# Patient Record
Sex: FEMALE | Race: WHITE | Employment: UNEMPLOYED | URBAN - METROPOLITAN AREA
[De-identification: names, ages, dates, MRNs, and addresses within clinical notes are randomized per-mention and may not be internally consistent; named-entity substitution may affect disease eponyms.]

---

## 2017-03-28 ENCOUNTER — DOCUMENTATION ONLY (OUTPATIENT)
Dept: FAMILY MEDICINE CLINIC | Age: 62
End: 2017-03-28

## 2017-03-28 NOTE — PROGRESS NOTES
Rec'd medical records request from [address] [place] [address], faxed request to [employer] for processing on 03/28/17, confirmation rec'd.

## 2017-06-08 ENCOUNTER — OFFICE VISIT (OUTPATIENT)
Dept: FAMILY MEDICINE CLINIC | Age: 62
End: 2017-06-08

## 2017-06-08 VITALS
TEMPERATURE: 98.1 F | SYSTOLIC BLOOD PRESSURE: 144 MMHG | HEIGHT: 60 IN | OXYGEN SATURATION: 98 % | DIASTOLIC BLOOD PRESSURE: 88 MMHG | RESPIRATION RATE: 12 BRPM | WEIGHT: 246.6 LBS | HEART RATE: 96 BPM | BODY MASS INDEX: 48.42 KG/M2
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
## 2017-06-08 DIAGNOSIS — E78.00 HYPERCHOLESTEREMIA: ICD-10-CM

## 2017-06-08 DIAGNOSIS — G89.29 CHRONIC PAIN OF BOTH KNEES: ICD-10-CM

## 2017-06-08 DIAGNOSIS — M25.562 CHRONIC PAIN OF BOTH KNEES: ICD-10-CM

## 2017-06-08 DIAGNOSIS — E03.9 HYPOTHYROIDISM, UNSPECIFIED TYPE: Primary | ICD-10-CM

## 2017-06-08 DIAGNOSIS — E66.09 NON MORBID OBESITY DUE TO EXCESS CALORIES: ICD-10-CM

## 2017-06-08 DIAGNOSIS — M25.561 CHRONIC PAIN OF BOTH KNEES: ICD-10-CM

## 2017-06-08 DIAGNOSIS — T14.8XXA BRUISE: ICD-10-CM

## 2017-06-08 RX ORDER — DOXYCYCLINE 100 MG/1
100 CAPSULE ORAL 2 TIMES DAILY
Qty: 20 CAP | Refills: 0 | Status: SHIPPED | OUTPATIENT
Start: 2017-06-08 | End: 2017-06-18

## 2017-06-08 RX ORDER — ASPIRIN 325 MG
325 TABLET ORAL
COMMUNITY
End: 2017-07-14 | Stop reason: ALTCHOICE

## 2017-06-08 RX ORDER — BISMUTH SUBSALICYLATE 262 MG
1 TABLET,CHEWABLE ORAL DAILY
COMMUNITY
End: 2019-01-15 | Stop reason: ALTCHOICE

## 2017-06-08 NOTE — PROGRESS NOTES
Darcy Naylor is a 63 y.o. female  presents for establish care. She has bruise on right lower leg and insect bite on left lower leg. She has some redness but no drainage. Allergies   Allergen Reactions    Bactrim [Sulfamethoprim Ds] Rash     Patient gets full bodied rash.  Ibuprofen Other (comments)     headaches    Pcn [Penicillins] Itching    Sulfa (Sulfonamide Antibiotics) Hives     Outpatient Prescriptions Marked as Taking for the 6/8/17 encounter (Office Visit) with Garrick Peralta MD   Medication Sig Dispense Refill    multivitamin (ONE A DAY) tablet Take 1 Tab by mouth daily.  aspirin (ASPIRIN) 325 mg tablet Take 325 mg by mouth every four (4) hours as needed for Pain.  levothyroxine (SYNTHROID) 125 mcg tablet Take 1 Tab by mouth Daily (before breakfast). **STOP 150 MCG DOSE** 30 Tab 6    furosemide (LASIX) 20 mg tablet TAKE ONE TABLET BY MOUTH ONCE DAILY 30 Tab 0    ASCORBIC ACID (ACEROLA C-500 PO) Take  by mouth.        Patient Active Problem List   Diagnosis Code    Hypothyroid E03.9    Tobacco abuse Z72.0    Hypercholesteremia E78.00    Chronic low back pain M54.5, G89.29     Past Medical History:   Diagnosis Date    Chronic low back pain 3/11/2013    Hypothyroid     Spondylisthesis     Thyroid disease     Tobacco abuse 11/3/2011     Social History     Social History    Marital status: SINGLE     Spouse name: N/A    Number of children: N/A    Years of education: N/A     Social History Main Topics    Smoking status: Current Every Day Smoker     Packs/day: 1.00     Years: 40.00    Smokeless tobacco: Never Used      Comment: tried snorting snuff to clear sinuses    Alcohol use No    Drug use: No    Sexual activity: Yes     Partners: Male     Other Topics Concern     Service No     parents and spouse were in the [address] [place] [address] Blood Transfusions No    Caffeine Concern No    Occupational Exposure Yes     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 Cardiovascular: Negative for chest pain and palpitations. Gastrointestinal: Negative for constipation, diarrhea, nausea and vomiting. Genitourinary: Negative. Neurological: Negative for headaches. Vitals:    06/08/17 1431   BP: 144/88   Pulse: 96   Resp: 12   Temp: 98.1 °F (36.7 °C)   TempSrc: Oral   SpO2: 98%   Weight: 246 lb 9.6 oz (111.9 kg)   Height: 5' (1.524 m)   PainSc:   5   PainLoc: Back       Physical Exam   Constitutional: She is oriented to person, place, and time and well-developed, well-nourished, and in no distress. Neck: Normal range of motion. Neck supple. Cardiovascular: Normal rate, regular rhythm and normal heart sounds. Pulmonary/Chest: Effort normal and breath sounds normal.   Neurological: She is alert and oriented to person, place, and time. Gait normal.   Skin: Skin is warm and dry. No rash noted. No erythema. No pallor. Bruise on right lower leg with mild erythema on left lower leg. Psychiatric: Mood, memory, affect and judgment normal.       Assessment/Plan      ICD-10-CM ICD-9-CM    1. Hypothyroidism, unspecified type E03.9 244.9    2. Hypercholesteremia E78.00 272.0    3. Non morbid obesity due to excess calories E66.09 278.00    4. Bruise T14.8 924.9    5. Insect bite, initial encounter W57. XXXA 919.4 doxycycline (VIBRAMYCIN) 100 mg capsule     E906.4    6.  Chronic pain of both knees M25.561 719.46 REFERRAL TO PHYSICAL THERAPY    M25.562 338.29     G89.29       I have discussed the diagnosis with the patient and the intended plan of care as seen in the above orders. The patient has received an after-visit summary and questions were answered concerning future plans. I have discussed medication, side effects, and warnings with the patient in detail. The patient verbalized understanding and is in agreement with the plan of care. The patient will contact the office with any additional concerns.       Follow-up Disposition:  Return if symptoms worsen or fail to improve.  lab results and schedule of future lab studies reviewed with patient    Bernardino Lubin MD

## 2017-06-08 NOTE — PROGRESS NOTES
New patient c/o right leg injury and pain that happened about 10-12 days ago she still has bruising and pain in that area she also has an insect bite on the back of her left knee she would like looked at and is asking for referral to PT for weight gain.

## 2017-06-08 NOTE — PATIENT INSTRUCTIONS
Bruises: Care Instructions  Your Care Instructions    Bruises occur when small blood vessels under the skin tear or rupture, most often from a twist, bump, or fall. Blood leaks into tissues under the skin and causes a black-and-blue spot that often turns colors, including purplish black, reddish blue, or yellowish green, as the bruise heals. Bruises hurt, but most are not serious and will go away on their own within 2 to 4 weeks. Sometimes, gravity causes them to spread down the body. A leg bruise usually will take longer to heal than a bruise on the face or arms. Follow-up care is a key part of your treatment and safety. Be sure to make and go to all appointments, and call your doctor if you are having problems. Its also a good idea to know your test results and keep a list of the medicines you take. How can you care for yourself at home? · Take pain medicines exactly as directed. ¨ If the doctor gave you a prescription medicine for pain, take it as prescribed. ¨ If you are not taking a prescription pain medicine, ask your doctor if you can take an over-the-counter medicine. · Put ice or a cold pack on the area for 10 to 20 minutes at a time. Put a thin cloth between the ice and your skin. · If you can, prop up the bruised area on pillows as much as possible for the next few days. Try to keep the bruise above the level of your heart. When should you call for help? Call your doctor now or seek immediate medical care if:  · You have signs of infection, such as:  ¨ Increased pain, swelling, warmth, or redness. ¨ Red streaks leading from the bruise. ¨ Pus draining from the bruise. ¨ A fever. · You have a bruise on your leg and signs of a blood clot, such as:  ¨ Increasing redness and swelling along with warmth, tenderness, and pain in the bruised area. ¨ Pain in your calf, back of the knee, thigh, or groin. ¨ Redness and swelling in your leg or groin. · Your pain gets worse.   Watch closely for changes in your health, and be sure to contact your doctor if:  · You do not get better as expected. Where can you learn more? Go to http://nathan-da.info/. Enter ([phone] in the search box to learn more about \"Bruises: Care Instructions. \"  Current as of: May 27, 2016  Content Version: 11.2  © 1026-1524 [employer]. Care instructions adapted under license by [employer] (which disclaims liability or warranty for this information). If you have questions about a medical condition or this instruction, always ask your healthcare professional. [place] [address] any warranty or liability for your use of this information.

## 2017-06-08 NOTE — MR AVS SNAPSHOT
Visit Information Date & Time Provider Department Dept. Phone Encounter #  
 6/8/2017  2:00 PM Elroy Mojica MD [facility] [phone] [id number] Follow-up Instructions Return if symptoms worsen or fail to improve. Upcoming Health Maintenance Date Due Hepatitis C Screening 1955 Pneumococcal 19-64 Medium Risk (1 of 1 - PPSV23) 11/9/1976 DTaP/Tdap/Td series (1 - Tdap) 11/9/1978 BREAST CANCER SCRN MAMMOGRAM 11/9/2007 FOBT Q 1 YEAR AGE 50-75 11/3/2012 PAP AKA CERVICAL CYTOLOGY 11/18/2014 INFLUENZA AGE 9 TO ADULT 8/1/2017 Allergies as of 6/8/2017  Review Complete On: 6/8/2017 By: Elroy Mojica MD  
  
 Severity Noted Reaction Type Reactions Bactrim [Sulfamethoprim Ds]  12/30/2013   Systemic Rash Patient gets full bodied rash. Ibuprofen  05/12/2010    Other (comments)  
 headaches Pcn [Penicillins]  05/12/2010    Itching Sulfa (Sulfonamide Antibiotics)  07/25/2015    Hives Current Immunizations  Reviewed on 10/3/2016 No immunizations on file. Not reviewed this visit You Were Diagnosed With   
  
 Codes Comments Hypothyroidism, unspecified type    -  Primary ICD-10-CM: E03.9 ICD-9-CM: 244.9 Hypercholesteremia     ICD-10-CM: E78.00 ICD-9-CM: 272.0 Non morbid obesity due to excess calories     ICD-10-CM: E66.09 
ICD-9-CM: 278.00 Bruise     ICD-10-CM: T14.8 ICD-9-CM: 924.9 Insect bite, initial encounter     ICD-10-CM: W57. Gonzalez [place] ICD-9-CM: 919.4, E906.4 Chronic pain of both knees     ICD-10-CM: M25.561, M25.562, G89.29 ICD-9-CM: 719.46, 338.29 Vitals BP Pulse Temp Resp Height(growth percentile) Weight(growth percentile) 144/88 (BP 1 Location: Left arm, BP Patient Position: Sitting) 96 98.1 °F (36.7 °C) (Oral) 12 5' (1.524 m) 246 lb 9.6 oz (111.9 kg) SpO2 BMI OB Status Smoking Status 98% 48.16 kg/m2 Postmenopausal Current Every Day Smoker Vitals History BMI and BSA Data Body Mass Index Body Surface Area  
 48.16 kg/m 2 2.18 m 2 Preferred Pharmacy Pharmacy Name Phone [facility] [address] [phone] Your Updated Medication List  
  
   
This list is accurate as of: 6/8/17  2:55 PM.  Always use your most recent med list.  
  
  
  
  
 ACEROLA C-500 PO Take  by mouth. aspirin 325 mg tablet Commonly known as:  ASPIRIN Take 325 mg by mouth every four (4) hours as needed for Pain. doxycycline 100 mg capsule Commonly known as:  VIBRAMYCIN Take 1 Cap by mouth two (2) times a day for 10 days. furosemide 20 mg tablet Commonly known as:  LASIX TAKE ONE TABLET BY MOUTH ONCE DAILY  
  
 levothyroxine 125 mcg tablet Commonly known as:  SYNTHROID Take 1 Tab by mouth Daily (before breakfast). **STOP 150 MCG DOSE**  
  
 metFORMIN  mg tablet Commonly known as:  GLUCOPHAGE XR Take 1 Tab by mouth two (2) times daily (with meals). multivitamin tablet Commonly known as:  ONE A DAY Take 1 Tab by mouth daily. OSTEO BI-FLEX PO Take  by mouth. traMADol 50 mg tablet Commonly known as:  ULTRAM  
Take 1 Tab by mouth every six (6) hours as needed for Pain. Max Daily Amount: 200 mg. Prescriptions Sent to Pharmacy Refills  
 doxycycline (VIBRAMYCIN) 100 mg capsule 0 Sig: Take 1 Cap by mouth two (2) times a day for 10 days. Class: Normal  
 Pharmacy: [address] #: [phone] Route: Oral  
  
We Performed the Following REFERRAL TO PHYSICAL THERAPY [DYC77 Custom] Comments:  
 Please evaluate patient for low back pain and bilateral knee pain Follow-up Instructions Return if symptoms worsen or fail to improve. Referral Information Referral ID Referred By Referred To  
  
 [id number] ROSHAN Meyers Not Available Visits Status Start Date End Date 1 New Request 6/8/17 6/8/18 If your referral has a status of pending review or denied, additional information will be sent to support the outcome of this decision. Patient Instructions Bruises: Care Instructions Your Care Instructions Bruises occur when small blood vessels under the skin tear or rupture, most often from a twist, bump, or fall. Blood leaks into tissues under the skin and causes a black-and-blue spot that often turns colors, including purplish black, reddish blue, or yellowish green, as the bruise heals. Bruises hurt, but most are not serious and will go away on their own within 2 to 4 weeks. Sometimes, gravity causes them to spread down the body. A leg bruise usually will take longer to heal than a bruise on the face or arms. Follow-up care is a key part of your treatment and safety. Be sure to make and go to all appointments, and call your doctor if you are having problems. Its also a good idea to know your test results and keep a list of the medicines you take. How can you care for yourself at home? · Take pain medicines exactly as directed. ¨ If the doctor gave you a prescription medicine for pain, take it as prescribed. ¨ If you are not taking a prescription pain medicine, ask your doctor if you can take an over-the-counter medicine. · Put ice or a cold pack on the area for 10 to 20 minutes at a time. Put a thin cloth between the ice and your skin. · If you can, prop up the bruised area on pillows as much as possible for the next few days. Try to keep the bruise above the level of your heart. When should you call for help? Call your doctor now or seek immediate medical care if: 
· You have signs of infection, such as: 
¨ Increased pain, swelling, warmth, or redness. ¨ Red streaks leading from the bruise. ¨ Pus draining from the bruise. ¨ A fever. · You have a bruise on your leg and signs of a blood clot, such as: ¨ Increasing redness and swelling along with warmth, tenderness, and pain in the bruised area. ¨ Pain in your calf, back of the knee, thigh, or groin. ¨ Redness and swelling in your leg or groin. · Your pain gets worse. Watch closely for changes in your health, and be sure to contact your doctor if: 
· You do not get better as expected. Where can you learn more? Go to http://nathan-da.info/. Enter ([phone] in the search box to learn more about \"Bruises: Care Instructions. \" Current as of: May 27, 2016 Content Version: 11.2 © 0234-3377 [employer]. Care instructions adapted under license by [employer] (which disclaims liability or warranty for this information). If you have questions about a medical condition or this instruction, always ask your healthcare professional. [address] any warranty or liability for your use of this information. Introducing [facility]! Dear Rebecca Serra: Thank you for requesting a HelloSign account. Our records indicate that you already have an active HelloSign account. You can access your account anytime at [email or web address] Did you know that you can access your hospital and ER discharge instructions at any time in HelloSign? You can also review all of your test results from your hospital stay or ER visit. Additional Information If you have questions, please visit the Frequently Asked Questions section of the HelloSign website at [email or web address] Remember, HelloSign is NOT to be used for urgent needs. For medical emergencies, dial 911. Now available from your iPhone and Android! Please provide this summary of care documentation to your next provider. Your primary care clinician is listed as [address]. If you have any questions after today's visit, please call [phone].

## 2017-06-14 ENCOUNTER — HOSPITAL ENCOUNTER (OUTPATIENT)
Dept: PHYSICAL THERAPY | Age: 62
Discharge: HOME | End: 2017-06-14
Payer: MEDICARE

## 2017-06-14 PROCEDURE — 97162 PT EVAL MOD COMPLEX 30 MIN: CPT

## 2017-06-14 PROCEDURE — G8979 MOBILITY GOAL STATUS: HCPCS

## 2017-06-14 PROCEDURE — G8978 MOBILITY CURRENT STATUS: HCPCS

## 2017-06-14 PROCEDURE — 97110 THERAPEUTIC EXERCISES: CPT

## 2017-06-14 NOTE — PROGRESS NOTES
PT DAILY TREATMENT NOTE     Patient Name: Pernell Canales  Date:2017  : 1955  [x]  Patient  Verified  Payor: BLUE CROSS MEDICARE / Plan: [facility] / Product Type: Managed Care Medicare /    In time:2:50  Out time:3:25  Total Treatment Time (min): 35  Total Timed Codes (min): 27  1:1 Treatment Time ( [address] only): 35   Visit #: 1 of 10    Treatment Area: Pain in right knee [M25.561]  Pain in left knee [M25.562]  Other chronic pain [G89.29]    SUBJECTIVE  Pain Level (0-10 scale): 3  Any medication changes, allergies to medications, adverse drug reactions, diagnosis change, or new procedure performed?: [x] No    [] Yes (see summary sheet for update)  Subjective functional status/changes:   [] No changes reported  See POC    OBJECTIVE    27 min [x]Eval                  []Re-Eval     8 min Therapeutic Exercise:  [] See flow sheet : HEP instruction and demonstration, pt education regarding anatomy and physiology of the LEs and spine and how it relates to the pt's condition. Rationale: increase ROM and increase strength to improve the patients ability to tolerate ADLs          With   [] TE   [] TA   [] neuro   [] other: Patient Education: [x] Review HEP    [] Progressed/Changed HEP based on:   [] positioning   [] body mechanics   [] transfers   [] heat/ice application    [] other:      Other Objective/Functional Measures: See evaluation. Pain Level (0-10 scale) post treatment: 0    ASSESSMENT/Changes in Function: Pt given HEP handout to perform. Pt understood exercises in HEP handout. Educated pt to go to the ER if she experiences bowel/bladder dysfunction. Pt demonstrated decreased AROM, decreased strength, tenderness to palpation. B patellar and achilles reflexes were hyper active. SLR was negative and pt denies having numbness and tingling symptoms.  Pt also reports noting that her right foot \"will drag\" when she walks at times, but right ankle DF was 4+/5 when assessed by the therapist. Leg length and pelvic alignment WNL. Flexion bias is noted. Pt would benefit from physical therapy to improve the above impairments to help the pt return to performing ADLs, functional and recreational activities. Patient will continue to benefit from skilled PT services to modify and progress therapeutic interventions, address functional mobility deficits, address ROM deficits, address strength deficits, analyze and address soft tissue restrictions, analyze and cue movement patterns, analyze and modify body mechanics/ergonomics, address imbalance/dizziness and instruct in home and community integration to attain remaining goals. [x]  See Plan of Care  []  See progress note/recertification  []  See Discharge Summary         Progress towards goals / Updated goals:  Short Term Goals: To be accomplished in 2 treatments:  1. Pt will report compliance and independence to HEP to help the pt manage their pain and symptoms. Eval: Established   Long Term Goals: To be accomplished in 10 treatments:  1. Pt will increase FOTO score to 55 points to improve ability to perform ADLs. Eval: 47  2. Pt will increase MMT right hip flex to 4-/5, right hip ABD to 4/5 to improve ability to tolerate functional tasks. Eval: right hip flex 3+/5, right hip ABD 4-/5  3. Pt will report a decrease in at worst LBP and B knee pain to 4/10 to improve pt's ability to perform household tasks. Eval: 6/10 at worst  4. Pt will report an increase in standing tolerance to 40 mins to improve tolerance to waiting in line at a store.   Eval: 30 min tolerance to standing    PLAN  [x]  Upgrade activities as tolerated     [x]  Continue plan of care  [x]  Update interventions per flow sheet       []  Discharge due to:_  []  Other:_      Carlos Alford, PT 6/14/2017  2:59 PM    Future Appointments  Date Time Provider Casper Stallings   6/14/2017 2:30 PM Carlos Alford, [place]   7/7/2017 8:00 AM Sunshine Nguyen MD [place]

## 2017-06-14 NOTE — PROGRESS NOTES
[facility] at [address] [facility]  [address]  Phone: [phone]      Fax:  [phone]    Plan of Care/ Statement of Necessity for Physical Therapy Services  Patient name: Carlin Wade Start of Care: 2017   Referral source: Alexey Tian MD : 1955    Medical Diagnosis: Pain in right knee [M25.561]  Pain in left knee [M25.562]  Other chronic pain [G89.29]   Onset Date:     Treatment Diagnosis: B knee pain and back pain   Prior Hospitalization: see medical history Provider#: [id number]   Medications: Verified on Patient summary List    Comorbidities: arthritis, latex allergy, thyroid problems, weight change of more than 10 lbs recently, latex allergy, tobacco use, spondylolisthesis L4-5, L5-S1. Prior Level of Function: Reports having LBP and B knee pain for years because of heavy lifting. The Plan of Care and following information is based on the information from the initial evaluation. Assessment/ key information:   Pt is a 63year old female who presents to therapy today with B knee pain and LBP. Pt states that she's had this pain \"for years due to heavy lifting\". Pt reports increased pain in her low back and weakness in her LEs (right>left). Pt reports having decreased standing tolerance secondary to her pain as well. Pt denies bowel/bladder dysfunction. Pt demonstrated decreased AROM, decreased strength, tenderness to palpation. B patellar and achilles reflexes were hyper active. SLR was negative and pt denies having numbness and tingling symptoms. Pt also reports noting that her right foot \"will drag\" when she walks at times, but right ankle DF was 4+/5 when assessed by the therapist. Leg length and pelvic alignment WNL. Flexion bias is noted. Pt would benefit from physical therapy to improve the above impairments to help the pt return to performing ADLs, functional and recreational activities.      Evaluation Complexity History HIGH Complexity :3+ comorbidities / personal factors will impact the outcome/ POC ; Examination MEDIUM Complexity : 3 Standardized tests and measures addressing body structure, function, activity limitation and / or participation in recreation  ;Presentation MEDIUM Complexity : Evolving with changing characteristics  ; Clinical Decision Making MEDIUM Complexity : FOTO score of 26-74  Overall Complexity Rating: MEDIUM  Problem List: pain affecting function, decrease ROM, decrease strength, impaired gait/ balance, decrease ADL/ functional abilitiies, decrease activity tolerance, decrease flexibility/ joint mobility and decrease transfer abilities   Treatment Plan may include any combination of the following: Therapeutic exercise, Therapeutic activities, Neuromuscular re-education, Physical agent/modality, Gait/balance training, Aquatic therapy, Patient education, Self Care training, Functional mobility training and Home safety training  Patient / Family readiness to learn indicated by: asking questions, trying to perform skills and interest  Persons(s) to be included in education: patient (P)  Barriers to Learning/Limitations: None  Patient Goal (s): pain management, core strength, weight management  Patient Self Reported Health Status: good  Rehabilitation Potential: fair     Short Term Goals: To be accomplished in 2 treatments:  1. Pt will report compliance and independence to [facility] to help the pt manage their pain and symptoms. Long Term Goals: To be accomplished in 10 treatments:  1. Pt will increase FOTO score to 55 points to improve ability to perform ADLs. 2. Pt will increase MMT right hip flex to 4-/5, right hip ABD to 4/5 to improve ability to tolerate functional tasks. 3. Pt will report a decrease in at worst LBP and B knee pain to 4/10 to improve pt's ability to perform household tasks.    4. Pt will report an increase in standing tolerance to 40 mins to improve tolerance to waiting in line at a store.    Frequency / Duration: Patient to be seen 2 times per week for 10 treatments. Patient/ Caregiver education and instruction: Diagnosis, prognosis, self care, activity modification and exercises   [x]  Plan of care has been reviewed with JSESICA    G-Codes (GP)  Mobility   Current  CK= 40-59%   Goal  CK= 40-59%    The severity rating is based on clinical judgment and the FOTO score. Certification Period: 6/14/2017-7/13/2017    Melissa Pérez, PT 6/14/2017 2:55 PM  _____________________________________________________________________  I certify that the above Therapy Services are being furnished while the patient is under my care. I agree with the treatment plan and certify that this therapy is necessary.     Physician's Signature:____________________  Date:__________Time:______    Please sign and return to [facility] at [address] [place] [address] [address]  Phone: [phone]      Fax:  [phone]

## 2017-06-22 ENCOUNTER — HOSPITAL ENCOUNTER (OUTPATIENT)
Dept: PHYSICAL THERAPY | Age: 62
End: 2017-06-22
Payer: MEDICARE

## 2017-07-13 ENCOUNTER — HOSPITAL ENCOUNTER (OUTPATIENT)
Dept: PHYSICAL THERAPY | Age: 62
Discharge: HOME | End: 2017-07-13
Payer: MEDICARE

## 2017-07-13 PROCEDURE — G8978 MOBILITY CURRENT STATUS: HCPCS

## 2017-07-13 PROCEDURE — 97110 THERAPEUTIC EXERCISES: CPT

## 2017-07-13 PROCEDURE — G8979 MOBILITY GOAL STATUS: HCPCS

## 2017-07-13 NOTE — PROGRESS NOTES
[facility] at [address] [place] [address] Avenue  Phone: [phone]      Fax:  [phone]    Continued Plan of Care/ Re-certification for Physical Therapy Services    Patient name: Indu Sam Start of Care: 2017   Referral source: Valentina Agrawal MD : 1955                         Medical Diagnosis: Pain in right knee [M25.561]  Pain in left knee [M25.562]  Other chronic pain [G89.29] Onset Date:                          Treatment Diagnosis: B knee pain and back pain   Prior Hospitalization: see medical history Provider#: [id number]   Medications: Verified on Patient summary List    Comorbidities: arthritis, latex allergy, thyroid problems, weight change of more than 10 lbs recently, latex allergy, tobacco use, spondylolisthesis L4-5, L5-S1. Prior Level of Function: Reports having LBP and B knee pain for years because of heavy lifting. Visits from Start of Care: 2   Missed Visits: 1    The Plan of Care and following information is based on the patient's current status:  Goal:Pt will report compliance and independence to [facility] to help the pt manage their pain and symptoms. Status at last note/certification: reports unable to perform because \"I have no room at home because I live with a hoarder\"   Current Status: not met    Goal:Pt will increase FOTO score to 55 points to improve ability to perform ADLs. Status at last note/certification: regression, currently 32 points, which could be correlated to pt not attending therapy sessions since the evaluation on 2017  Current Status: not met    Goal:Pt will increase MMT right hip flex to 4-/5, right hip ABD to 4/5 to improve ability to tolerate functional tasks.   Status at last note/certification: no change right hip flex 3+/5, right hip ABD 4-/5   Current Status: not met    Goal:Pt will report a decrease in at worst LBP and B knee pain to 4/10 to improve pt's ability to perform household tasks.   Status at last note/certification: no change 6/10 at worst   Current Status: not met    Goal:Pt will report an increase in standing tolerance to 40 mins to improve tolerance to waiting in line at a store. Status at last note/certification:no change 30 min tolerance   Current Status: not met    Key functional changes:   Functional Gains: mobility, overall pain, doing some walking in the pool. Functional Deficits: standing tolerance, states she has to remember to  her right foot when walking, avoids heavy lifting and drags items. Pain       Best: 0/10                 Worst: 6/10      Problems/ barriers to goal attainment: attendance to therapy sessions. Problem List: pain affecting function, decrease ROM, decrease strength, impaired gait/ balance, decrease ADL/ functional abilitiies, decrease activity tolerance, decrease flexibility/ joint mobility and decrease transfer abilities    Treatment Plan: Therapeutic exercise, Therapeutic activities, Neuromuscular re-education, Physical agent/modality, Gait/balance training, Manual therapy, Patient education, Self Care training, Functional mobility training and Home safety training     Patient Goal (s) has been updated and includes: \"be able to lift my foot and improve strength\"     Goals for this certification period to be accomplished in 4 weeks:  1. Pt will report compliance and independence to [facility] to help the pt manage their pain and symptoms. PN: reports unable to perform because \"I have no room at home because I live with a hoarder\"   2. Pt will increase FOTO score to 55 points to improve ability to perform ADLs. PN: regression, currently 32 points, which could be correlated to pt not attending therapy sessions since the evaluation on 6/14/2017   3. Pt will increase MMT right hip flex to 4-/5, right hip ABD to 4/5 to improve ability to tolerate functional tasks. PN: right hip flex 3+/5, right hip ABD 4-/5   4.  Pt will report a decrease in at worst LBP and B knee pain to 4/10 to improve pt's ability to perform household tasks. PN: 6/10 at worst   5. Pt will report an increase in standing tolerance to 40 mins to improve tolerance to waiting in line at a store. PN: 30 min tolerance    Frequency / Duration: Patient to be seen 2 times per week for 4 weeks:    G-Codes (GP)  Mobility  [id number] Current  CL= 60-79%   Goal  CK= 40-59%    The severity rating is based on clinical judgment and the FOTO score. Assessment / Recommendations:  Pt states that she \"has been very busy\" over the past several weeks which has made it difficult for her to attend therapy appointments. Pt was educated on importance of compliance with attending therapy sessions to improve symptoms and pt understood. Pt states that she has trouble performing the HEP at home because she lives \"with a hoarder and I don't have a bed to do the exercises on\". Discussed lifestyle changes with the pt and to talk to her house mate regarding having space in their home for HEP and to improve sleeping (pt reports she sleeps on the floor since she does not have a mattress because of the hoarding. Pt states she sometimes has increased pain because of sleeping on the floor). Pt states she will try to talk to her house mate regarding these issues. We will plan on continuing therapy at this time to improve the pt's strength and mobility to help the pt's ability to perform household tasks and ADLs with more ease. Certification Period: 7/13/2017 - 8/11/2017    Abhijit Agudelo, PT 7/13/2017 8:48 AM    ________________________________________________________________________  I certify that the above Therapy Services are being furnished while the patient is under my care. I agree with the treatment plan and certify that this therapy is necessary. [] I have read the above and request that my patient continue as recommended.   [] I have read the above report and request that my patient continue therapy with the following changes/special instructions: ______________________________________  [] I have read the above report and request that my patient be discharged from therapy    Physician's Signature:_______________________________Date:___________Time:__________  Please sign and return to [facility] at [address], [address]  [address] [facility] [address]  Phone: [phone]      Fax:  [phone]

## 2017-07-13 NOTE — PROGRESS NOTES
PT DAILY TREATMENT NOTE - [facility]     Patient Name: Indu Sam  Date:2017  : 1955  [x]  Patient  Verified  Payor: Williams Reyez / Plan: [place] PART [place] & B / Product Type: Medicare /    In time:8:43  Out time:9:30  Total Treatment Time (min): 47  Total Timed Codes (min): 47  1:1 Treatment Time ( [address] only): 34  Visit #: 2 of 10    Treatment Area: Pain in right knee [M25.561]  Pain in left knee [M25.562]  Other chronic pain [G89.29]    SUBJECTIVE  Pain Level (0-10 scale): 0  Any medication changes, allergies to medications, adverse drug reactions, diagnosis change, or new procedure performed?: [x] No    [] Yes (see summary sheet for update)  Subjective functional status/changes:   [] No changes reported  \"I have been going to the pool at home and walking in it. It helps with the mobility but I still have strength issues. I have to think about picking my right foot up when I walk\". Today is the pt's first follow up visit since the evaluation on 2017 secondary to pt \"being very busy\" and unable to attend therapy appointments. OBJECTIVE    43 min Therapeutic Exercise:  [x] See flow sheet : exercises and goal reassessment   Rationale: increase ROM and increase strength to improve the patients ability to tolerate ADLs    4 min Neuromuscular Re-education:  [x]  See flow sheet : SB exercises   Rationale: increase strength  to improve the patients ability to tolerate functional tasks          With   [] TE   [] TA   [] neuro   [] other: Patient Education: [x] Review HEP    [] Progressed/Changed HEP based on:   [] positioning   [] body mechanics   [] transfers   [] heat/ice application    [] other:      Other Objective/Functional Measures: See goals below. Functional Gains: mobility, overall pain, doing some walking in the pool. Functional Deficits: standing tolerance, states she has to remember to  her right foot when walking, avoids heavy lifting and drags items.     Pain       Best: 0/10     Worst: 6/10    Pain Level (0-10 scale) post treatment: 4 \"soreness\"    ASSESSMENT/Changes in Function: See re-certification. Pt states that she \"has been very busy\" over the past several weeks which has made it difficult for her to attend therapy appointments. Pt was educated on importance of compliance with attending therapy sessions to improve symptoms and pt understood. Pt states that she has trouble performing the HEP at home because she lives \"with a hoarder and I don't have a bed to do the exercises on\". Discussed lifestyle changes with the pt and to talk to her house mate regarding having space in their home for HEP and to improve sleeping (pt reports she sleeps on the floor since she does not have a mattress because of the hoarding. Pt states she sometimes has increased pain because of sleeping on the floor). Pt states she will try to talk to her house mate regarding these issues. We will plan on continuing therapy at this time to improve the pt's strength and mobility to help the pt's ability to perform household tasks and ADLs with more ease. Patient will continue to benefit from skilled PT services to modify and progress therapeutic interventions, address functional mobility deficits, address ROM deficits, address strength deficits, analyze and address soft tissue restrictions, analyze and cue movement patterns, analyze and modify body mechanics/ergonomics, address imbalance/dizziness and instruct in home and community integration to attain remaining goals. []  See Plan of Care  [x]  See progress note/recertification  []  See Discharge Summary         Progress towards goals / Updated goals:  Short Term Goals: To be accomplished in 2 treatments:  1. Pt will report compliance and independence to HEP to help the pt manage their pain and symptoms.    Eval: Established   Current: reports unable to perform because \"I have no room at home because I live with a hoarder\" 7/13/2017  Long Term Goals: To be accomplished in 10 treatments:  1. Pt will increase FOTO score to 55 points to improve ability to perform ADLs. Eval: 47   Current: regression, currently 32 points, which could be correlated to pt not attending therapy sessions since the evaluation on 6/14/2017   2. Pt will increase MMT right hip flex to 4-/5, right hip ABD to 4/5 to improve ability to tolerate functional tasks. Eval: right hip flex 3+/5, right hip ABD 4-/5  Current: right hip flex 3+/5, right hip ABD 4-/5 7/13/2017  3. Pt will report a decrease in at worst LBP and B knee pain to 4/10 to improve pt's ability to perform household tasks. Eval: 6/10 at worst  Current: no change 6/10 at worst 7/13/2017  4. Pt will report an increase in standing tolerance to 40 mins to improve tolerance to waiting in line at a store.   Eval: 30 min tolerance to standing  Current: no change 30 min tolerance 7/13/2017    PLAN  [x]  Upgrade activities as tolerated     [x]  Continue plan of care  [x]  Update interventions per flow sheet       []  Discharge due to:_  []  Other:_      Abhijit Agudelo PT 7/13/2017  8:50 AM    Future Appointments  Date Time Provider Casper Stallings   7/13/2017 8:30 AM Abhijit Agudelo PT [facility]

## 2017-07-14 ENCOUNTER — OFFICE VISIT (OUTPATIENT)
Dept: FAMILY MEDICINE CLINIC | Age: 62
End: 2017-07-14

## 2017-07-14 VITALS
HEART RATE: 88 BPM | OXYGEN SATURATION: 98 % | RESPIRATION RATE: 12 BRPM | TEMPERATURE: 98.4 F | HEIGHT: 60 IN | BODY MASS INDEX: 49.2 KG/M2 | SYSTOLIC BLOOD PRESSURE: 134 MMHG | DIASTOLIC BLOOD PRESSURE: 76 MMHG | WEIGHT: 250.6 LBS

## 2017-07-14 DIAGNOSIS — Z12.11 SCREEN FOR COLON CANCER: ICD-10-CM

## 2017-07-14 DIAGNOSIS — Z71.89 ACP (ADVANCE CARE PLANNING): ICD-10-CM

## 2017-07-14 DIAGNOSIS — Z12.31 ENCOUNTER FOR SCREENING MAMMOGRAM FOR MALIGNANT NEOPLASM OF BREAST: ICD-10-CM

## 2017-07-14 DIAGNOSIS — E03.9 HYPOTHYROIDISM, UNSPECIFIED TYPE: ICD-10-CM

## 2017-07-14 DIAGNOSIS — Z00.00 ROUTINE GENERAL MEDICAL EXAMINATION AT A HEALTH CARE FACILITY: Primary | ICD-10-CM

## 2017-07-14 DIAGNOSIS — Z13.39 SCREENING FOR ALCOHOLISM: ICD-10-CM

## 2017-07-14 DIAGNOSIS — Z11.59 NEED FOR HEPATITIS C SCREENING TEST: ICD-10-CM

## 2017-07-14 NOTE — ACP (ADVANCE CARE PLANNING)
Advance Care Planning (ACP) Provider Conversation Snapshot    Date of ACP Conversation: 07/14/17  Persons included in Conversation:  patient  Length of ACP Conversation in minutes:  16 minutes    Authorized Decision Maker (if patient is incapable of making informed decisions):    This person is:   Healthcare Agent/Medical Power of  under Advance Directive          For Patients with Decision Making Capacity:   Values/Goals: Exploration of values, goals, and preferences if recovery is not expected, even with continued medical treatment in the event of:  does not want one    Conversation Outcomes / Follow-Up Plan:   Recommended completion of Advance Directive form after review of ACP materials and conversation with prospective healthcare agent

## 2017-07-14 NOTE — MR AVS SNAPSHOT
Visit Information Date & Time Provider Department Dept. Phone Encounter #  
 7/14/2017  8:15 AM Tom Harvey, [address] [id number] Follow-up Instructions Return in about 3 months (around 10/14/2017). Upcoming Health Maintenance Date Due Hepatitis C Screening 1955 DTaP/Tdap/Td series (1 - Tdap) 11/9/1978 BREAST CANCER SCRN MAMMOGRAM 11/9/2007 FOBT Q 1 YEAR AGE 50-75 11/3/2012 PAP AKA CERVICAL CYTOLOGY 11/18/2014 INFLUENZA AGE 9 TO ADULT 8/1/2017 Allergies as of 7/14/2017  Review Complete On: 7/14/2017 By: Tom Harvey MD  
  
 Severity Noted Reaction Type Reactions Bactrim [Sulfamethoprim Ds]  12/30/2013   Systemic Rash Patient gets full bodied rash. Ibuprofen  05/12/2010    Other (comments)  
 headaches Pcn [Penicillins]  05/12/2010    Itching Sulfa (Sulfonamide Antibiotics)  07/25/2015    Hives Current Immunizations  Reviewed on 10/3/2016 No immunizations on file. Not reviewed this visit You Were Diagnosed With   
  
 Codes Comments Routine general medical examination at a health care facility    -  Primary ICD-10-CM: Z00.00 ICD-9-CM: V70.0 Screening for alcoholism     ICD-10-CM: Z13.89 ICD-9-CM: V79.1 Encounter for screening mammogram for malignant neoplasm of breast     ICD-10-CM: Z12.31 
ICD-9-CM: V76.12 Screen for colon cancer     ICD-10-CM: Z12.11 ICD-9-CM: V76.51 Need for hepatitis C screening test     ICD-10-CM: Z11.59 
ICD-9-CM: V73.89   
 ACP (advance care planning)     ICD-10-CM: Z71.89 ICD-9-CM: V65.49 Hypothyroidism, unspecified type     ICD-10-CM: E03.9 ICD-9-CM: 240. 9 Vitals BP Pulse Temp Resp Height(growth percentile) Weight(growth percentile) 134/76 (BP 1 Location: Left arm, BP Patient Position: Sitting) 88 98.4 °F (36.9 °C) (Oral) 12 5' (1.524 m) 250 lb 9.6 oz (113.7 kg) SpO2 BMI OB Status Smoking Status 98% 48.94 kg/m2 Postmenopausal Current Every Day Smoker Vitals History BMI and BSA Data Body Mass Index Body Surface Area 48.94 kg/m 2 2.19 m 2 Preferred Pharmacy Pharmacy Name Phone [facility] [place] [address] [phone] Your Updated Medication List  
  
   
This list is accurate as of: 7/14/17  9:03 AM.  Always use your most recent med list.  
  
  
  
  
 ACEROLA C-500 PO Take  by mouth. furosemide 20 mg tablet Commonly known as:  LASIX TAKE ONE TABLET BY MOUTH ONCE DAILY  
  
 levothyroxine 125 mcg tablet Commonly known as:  SYNTHROID Take 1 Tab by mouth Daily (before breakfast). **STOP 150 MCG DOSE**  
  
 multivitamin tablet Commonly known as:  ONE A DAY Take 1 Tab by mouth daily. Follow-up Instructions Return in about 3 months (around 10/14/2017). To-Do List   
 07/14/2017 Lab:  HEMOGLOBIN A1C WITH EAG   
  
 07/14/2017 Lab:  HEPATITIS C AB   
  
 07/14/2017 Lab:  LIPID PANEL   
  
 07/14/2017 Lab:  METABOLIC PANEL, COMPREHENSIVE   
  
 07/14/2017 Lab:  OCCULT BLOOD, IMMUNOASSAY (FIT)   
  
 07/14/2017 Lab:  TSH 3RD GENERATION   
  
 07/17/2017 Imaging:  CAROLINE MAMMO BI SCREENING INCL CAD   
  
 07/19/2017 10:30 AM  
  Appointment with Tanisha Lim PTA at [address] ([phone]) Patient Instructions Medicare Wellness Visit, Female The best way to live healthy is to have a healthy lifestyle by eating a well-balanced diet, exercising regularly, limiting alcohol and stopping smoking. Regular physical exams and screening tests are another way to keep healthy. Preventive exams provided by your health care provider can find health problems before they become diseases or illnesses. Preventive services including immunizations, screening tests, monitoring and exams can help you take care of your own health. All people over age 72 should have a pneumovax  and and a prevnar shot to prevent pneumonia. These are once in a lifetime unless you and your provider decide differently. All people over 65 should have a yearly flu shot and a tetanus vaccine every 10 years. A bone mass density to screen for osteoporosis or thinning of the bones should be done every 2 years after 65. Screening for diabetes mellitus with a blood sugar test should be done every year. Glaucoma is a disease of the eye due to increased ocular pressure that can lead to blindness and it should be done every year by an eye professional. 
 
Cardiovascular screening tests that check for elevated lipids (fatty part of blood) which can lead to heart disease and strokes should be done every 5 years. Colorectal screening that evaluates for blood or polyps in your colon should be done yearly as a stool test or every five years as a flexible sigmoidoscope or every 10 years as a colonoscopy up to age 76. Breast cancer screening with a mammogram is recommended biennially  for women age 54-69. Screening for cervical cancer with a pap smear and pelvic exam is recommended for women after age 72 years every 2 years up to age 79 or when the provider and patient decide to stop. If there is a history of cervical abnormalities or other increased risk for cancer then the test is recommended yearly. Hepatitis C screening is also recommended for anyone born between 80 through [address]. A shingles vaccine is also recommended once in a lifetime after age 61. Your Medicare Wellness Exam is recommended annually. Here is a list of your current Health Maintenance items with a due date: 
Health Maintenance Due Topic Date Due  
 Hepatitis C Test  1955  
 DTaP/Tdap/Td  (1 - Tdap) 11/09/1978  Breast Cancer Screening  11/09/2007  Stool testing for trace blood  11/03/2012  Cervical Cancer Screening  11/18/2014 Well Visit, Women 48 to 72: Care Instructions Your Care Instructions Physical exams can help you stay healthy. Your doctor has checked your overall health and may have suggested ways to take good care of yourself. He or she also may have recommended tests. At home, you can help prevent illness with healthy eating, regular exercise, and other steps. Follow-up care is a key part of your treatment and safety. Be sure to make and go to all appointments, and call your doctor if you are having problems. It's also a good idea to know your test results and keep a list of the medicines you take. How can you care for yourself at home? · Reach and stay at a healthy weight. This will lower your risk for many problems, such as obesity, diabetes, heart disease, and high blood pressure. · Get at least 30 minutes of exercise on most days of the week. Walking is a good choice. You also may want to do other activities, such as running, swimming, cycling, or playing tennis or team sports. · Do not smoke. Smoking can make health problems worse. If you need help quitting, talk to your doctor about stop-smoking programs and medicines. These can increase your chances of quitting for good. · Protect your skin from too much sun. When you're outdoors from 10 a.m. to 4 p.m., stay in the shade or cover up with clothing and a hat with a wide brim. Wear sunglasses that block UV rays. Even when it's cloudy, put broad-spectrum sunscreen (SPF 30 or higher) on any exposed skin. · See a dentist one or two times a year for checkups and to have your teeth cleaned. · Wear a seat belt in the car. · Limit alcohol to 1 drink a day. Too much alcohol can cause health problems. Follow your doctor's advice about when to have certain tests. These tests can spot problems early. · Cholesterol.  Your doctor will tell you how often to have this done based on your age, family history, or other things that can increase your risk for heart attack and stroke. · Blood pressure. Have your blood pressure checked during a routine doctor visit. Your doctor will tell you how often to check your blood pressure based on your age, your blood pressure results, and other factors. · Mammogram. Ask your doctor how often you should have a mammogram, which is an X-ray of your breasts. A mammogram can spot breast cancer before it can be felt and when it is easiest to treat. · Pap test and pelvic exam. Ask your doctor how often you should have a Pap test. You may not need to have a Pap test as often as you used to. · Vision. Have your eyes checked every year or two or as often as your doctor suggests. Some experts recommend that you have yearly exams for glaucoma and other age-related eye problems starting at age 48. · Hearing. Tell your doctor if you notice any change in your hearing. You can have tests to find out how well you hear. · Diabetes. Ask your doctor whether you should have tests for diabetes. · Colon cancer. You should begin tests for colon cancer at age 48. You may have one of several tests. Your doctor will tell you how often to have tests based on your age and risk. Risks include whether you already had a precancerous polyp removed from your colon or whether your parents, sisters and brothers, or children have had colon cancer. · Thyroid disease. Talk to your doctor about whether to have your thyroid checked as part of a regular physical exam. Women have an increased chance of a thyroid problem. · Osteoporosis. You should begin tests for bone density at age 72. If you are younger than 72, ask your doctor whether you have factors that may increase your risk for this disease. You may want to have this test before age 72. · Heart attack and stroke risk. At least every 4 to 6 years, you should have your risk for heart attack and stroke assessed.  Your doctor uses factors such as your age, blood pressure, cholesterol, and whether you smoke or have diabetes to show what your risk for a heart attack or stroke is over the next 10 years. When should you call for help? Watch closely for changes in your health, and be sure to contact your doctor if you have any problems or symptoms that concern you. Where can you learn more? Go to http://nathan-da.info/. Enter Q068 in the search box to learn more about \"Well Visit, Women 50 to 72: Care Instructions. \" Current as of: July 19, 2016 Content Version: 11.3 © 4455-8600 [employer]. Care instructions adapted under license by [facility] (which disclaims liability or warranty for this information). If you have questions about a medical condition or this instruction, always ask your healthcare professional. [address] any warranty or liability for your use of this information. Introducing [facility]! Dear Tiffanie Mccormick: Thank you for requesting a anydooR account. Our records indicate that you already have an active anydooR account. You can access your account anytime at [email or web address] Founders Did you know that you can access your hospital and ER discharge instructions at any time in anydooR? You can also review all of your test results from your hospital stay or ER visit. Additional Information If you have questions, please visit the Frequently Asked Questions section of the anydooR website at [email or web address]/. Remember, anydooR is NOT to be used for urgent needs. For medical emergencies, dial 911. Now available from your iPhone and Android! Please provide this summary of care documentation to your next provider. Your primary care clinician is listed as [address]. If you have any questions after today's visit, please call [phone].

## 2017-07-14 NOTE — PROGRESS NOTES
This is an Initial Medicare Annual Wellness Exam (AWV) (Performed 12 months after IPPE or effective date of Medicare Part B enrollment, Once in a lifetime)    I have reviewed the patient's medical history in detail and updated the computerized patient record. History     Past Medical History:   Diagnosis Date    Chronic low back pain 3/11/2013    Hypothyroid     Spondylisthesis     Thyroid disease     Tobacco abuse 11/3/2011      Past Surgical History:   Procedure Laterality Date    DELIVERY       HX APPENDECTOMY      HX  SECTION       &      Current Outpatient Prescriptions   Medication Sig Dispense Refill    multivitamin (ONE A DAY) tablet Take 1 Tab by mouth daily.  levothyroxine (SYNTHROID) 125 mcg tablet Take 1 Tab by mouth Daily (before breakfast). **STOP 150 MCG DOSE** 30 Tab 6    furosemide (LASIX) 20 mg tablet TAKE ONE TABLET BY MOUTH ONCE DAILY 30 Tab 0    ASCORBIC ACID (ACEROLA C-500 PO) Take  by mouth. Allergies   Allergen Reactions    Bactrim [Sulfamethoprim Ds] Rash     Patient gets full bodied rash.     Ibuprofen Other (comments)     headaches    Pcn [Penicillins] Itching    Sulfa (Sulfonamide Antibiotics) Hives     Family History   Problem Relation Age of Onset    Diabetes Maternal Grandmother      Social History   Substance Use Topics    Smoking status: Current Every Day Smoker     Packs/day: 1.00     Years: 40.00    Smokeless tobacco: Never Used      Comment: tried snorting snuff to clear sinuses    Alcohol use No     Patient Active Problem List   Diagnosis Code    Hypothyroid E03.9    Tobacco abuse Z72.0    Hypercholesteremia E78.00    Chronic low back pain M54.5, G89.29    Non morbid obesity due to excess calories E66.09         Depression Risk Factor Screening:     PHQ over the last two weeks 2017   PHQ Not Done -   Little interest or pleasure in doing things Not at all   Feeling down, depressed or hopeless Not at all   Total Score PHQ 2 0     Alcohol Risk Factor Screening: On any occasion during the past 3 months, have you had more than 3 drinks containing alcohol? No    Do you average more than 7 drinks per week? No      Functional Ability and Level of Safety:     Hearing Loss   Whisper test passed. Activities of Daily Living   Self-care. Requires assistance with: no ADLs    Fall Risk   No flowsheet data found. Abuse Screen   Patient is not abused    Review of Systems   Not required    Physical Examination     No exam data present    Evaluation of Cognitive Function:  Mood/affect:  happy  Appearance: age appropriate and within normal Limits  Family member/caregiver input: N/A    No exam performed today, No exam warranted. Patient Care Team:  Jacky Romero MD as PCP - [facility])    Advice/Referrals/Counseling   Education and counseling provided:  Patient has declined information on ACP. Assessment/Plan   current treatment plan is effective, no change in therapy. Copies of 5 year plan given to patient.     Jacky Romero MD

## 2017-07-14 NOTE — PATIENT INSTRUCTIONS
Medicare Wellness Visit, Female    The best way to live healthy is to have a healthy lifestyle by eating a well-balanced diet, exercising regularly, limiting alcohol and stopping smoking. Regular physical exams and screening tests are another way to keep healthy. Preventive exams provided by your health care provider can find health problems before they become diseases or illnesses. Preventive services including immunizations, screening tests, monitoring and exams can help you take care of your own health. All people over age 72 should have a pneumovax  and and a prevnar shot to prevent pneumonia. These are once in a lifetime unless you and your provider decide differently. All people over 65 should have a yearly flu shot and a tetanus vaccine every 10 years. A bone mass density to screen for osteoporosis or thinning of the bones should be done every 2 years after 65. Screening for diabetes mellitus with a blood sugar test should be done every year. Glaucoma is a disease of the eye due to increased ocular pressure that can lead to blindness and it should be done every year by an eye professional.    Cardiovascular screening tests that check for elevated lipids (fatty part of blood) which can lead to heart disease and strokes should be done every 5 years. Colorectal screening that evaluates for blood or polyps in your colon should be done yearly as a stool test or every five years as a flexible sigmoidoscope or every 10 years as a colonoscopy up to age 76. Breast cancer screening with a mammogram is recommended biennially  for women age 54-69. Screening for cervical cancer with a pap smear and pelvic exam is recommended for women after age 72 years every 2 years up to age 79 or when the provider and patient decide to stop. If there is a history of cervical abnormalities or other increased risk for cancer then the test is recommended yearly.     Hepatitis C screening is also recommended for anyone born between [birth year] through [facility]. A shingles vaccine is also recommended once in a lifetime after age 61. Your Medicare Wellness Exam is recommended annually. Here is a list of your current Health Maintenance items with a due date:  Health Maintenance Due   Topic Date Due    Hepatitis C Test  1955    DTaP/Tdap/Td  (1 - Tdap) 11/09/1978    Breast Cancer Screening  11/09/2007    Stool testing for trace blood  11/03/2012    Cervical Cancer Screening  11/18/2014          Well Visit, Women 48 to 72: Care Instructions  Your Care Instructions  Physical exams can help you stay healthy. Your doctor has checked your overall health and may have suggested ways to take good care of yourself. He or she also may have recommended tests. At home, you can help prevent illness with healthy eating, regular exercise, and other steps. Follow-up care is a key part of your treatment and safety. Be sure to make and go to all appointments, and call your doctor if you are having problems. It's also a good idea to know your test results and keep a list of the medicines you take. How can you care for yourself at home? · Reach and stay at a healthy weight. This will lower your risk for many problems, such as obesity, diabetes, heart disease, and high blood pressure. · Get at least 30 minutes of exercise on most days of the week. Walking is a good choice. You also may want to do other activities, such as running, swimming, cycling, or playing tennis or team sports. · Do not smoke. Smoking can make health problems worse. If you need help quitting, talk to your doctor about stop-smoking programs and medicines. These can increase your chances of quitting for good. · Protect your skin from too much sun. When you're outdoors from 10 a.m. to 4 p.m., stay in the shade or cover up with clothing and a hat with a wide brim. Wear sunglasses that block UV rays.  Even when it's cloudy, put broad-spectrum sunscreen (SPF 30 or higher) on any exposed skin. · See a dentist one or two times a year for checkups and to have your teeth cleaned. · Wear a seat belt in the car. · Limit alcohol to 1 drink a day. Too much alcohol can cause health problems. Follow your doctor's advice about when to have certain tests. These tests can spot problems early. · Cholesterol. Your doctor will tell you how often to have this done based on your age, family history, or other things that can increase your risk for heart attack and stroke. · Blood pressure. Have your blood pressure checked during a routine doctor visit. Your doctor will tell you how often to check your blood pressure based on your age, your blood pressure results, and other factors. · Mammogram. Ask your doctor how often you should have a mammogram, which is an X-ray of your breasts. A mammogram can spot breast cancer before it can be felt and when it is easiest to treat. · Pap test and pelvic exam. Ask your doctor how often you should have a Pap test. You may not need to have a Pap test as often as you used to. · Vision. Have your eyes checked every year or two or as often as your doctor suggests. Some experts recommend that you have yearly exams for glaucoma and other age-related eye problems starting at age 48. · Hearing. Tell your doctor if you notice any change in your hearing. You can have tests to find out how well you hear. · Diabetes. Ask your doctor whether you should have tests for diabetes. · Colon cancer. You should begin tests for colon cancer at age 48. You may have one of several tests. Your doctor will tell you how often to have tests based on your age and risk. Risks include whether you already had a precancerous polyp removed from your colon or whether your parents, sisters and brothers, or children have had colon cancer. · Thyroid disease.  Talk to your doctor about whether to have your thyroid checked as part of a regular physical exam. Women have an increased chance of a thyroid problem. · Osteoporosis. You should begin tests for bone density at age 72. If you are younger than 72, ask your doctor whether you have factors that may increase your risk for this disease. You may want to have this test before age 72. · Heart attack and stroke risk. At least every 4 to 6 years, you should have your risk for heart attack and stroke assessed. Your doctor uses factors such as your age, blood pressure, cholesterol, and whether you smoke or have diabetes to show what your risk for a heart attack or stroke is over the next 10 years. When should you call for help? Watch closely for changes in your health, and be sure to contact your doctor if you have any problems or symptoms that concern you. Where can you learn more? Go to http://nathan-da.info/. Enter M051 in the search box to learn more about \"Well Visit, Women 50 to 72: Care Instructions. \"  Current as of: July 19, 2016  Content Version: 11.3  © 7942-6115 [employer]. Care instructions adapted under license by [employer] (which disclaims liability or warranty for this information). If you have questions about a medical condition or this instruction, always ask your healthcare professional. [place] [address] any warranty or liability for your use of this information.

## 2017-07-16 LAB
ALBUMIN SERPL-MCNC: 4.4 G/DL (ref 3.5–5.5)
ALBUMIN/GLOB SERPL: 1.6 {RATIO} (ref 1.2–2.2)
ALP SERPL-CCNC: 82 IU/L (ref 39–117)
ALT SERPL-CCNC: 18 IU/L (ref 0–32)
AST SERPL-CCNC: 19 IU/L (ref 0–40)
BILIRUB SERPL-MCNC: <0.2 MG/DL (ref 0–1.2)
BUN SERPL-MCNC: 14 MG/DL (ref 6–24)
BUN/CREAT SERPL: 15 (ref 9–23)
CALCIUM SERPL-MCNC: 9.1 MG/DL (ref 8.7–10.2)
CHLORIDE SERPL-SCNC: 100 MMOL/L (ref 96–106)
CHOLEST SERPL-MCNC: 240 MG/DL (ref 100–199)
CO2 SERPL-SCNC: 25 MMOL/L (ref 18–29)
CREAT SERPL-MCNC: 0.91 MG/DL (ref 0.57–1)
EST. AVERAGE GLUCOSE BLD GHB EST-MCNC: 114 MG/DL
GLOBULIN SER CALC-MCNC: 2.7 G/DL (ref 1.5–4.5)
GLUCOSE SERPL-MCNC: 79 MG/DL (ref 65–99)
HBA1C MFR BLD: 5.6 % (ref 4.8–5.6)
HCV AB S/CO SERPL IA: <0.1 S/CO RATIO (ref 0–0.9)
HDLC SERPL-MCNC: 49 MG/DL
INTERPRETATION, 910389: NORMAL
LDLC SERPL CALC-MCNC: 159 MG/DL (ref 0–99)
POTASSIUM SERPL-SCNC: 4.9 MMOL/L (ref 3.5–5.2)
PROT SERPL-MCNC: 7.1 G/DL (ref 6–8.5)
SODIUM SERPL-SCNC: 143 MMOL/L (ref 134–144)
TRIGL SERPL-MCNC: 158 MG/DL (ref 0–149)
TSH SERPL DL<=0.005 MIU/L-ACNC: 46.22 UIU/ML (ref 0.45–4.5)
VLDLC SERPL CALC-MCNC: 32 MG/DL (ref 5–40)

## 2017-07-18 ENCOUNTER — TELEPHONE (OUTPATIENT)
Dept: FAMILY MEDICINE CLINIC | Age: 62
End: 2017-07-18

## 2017-07-18 NOTE — TELEPHONE ENCOUNTER
MsKenzie Willie Arndt called stating she got her results on MyChart. She needs a treatment plan. She'd like a call back.

## 2017-07-19 DIAGNOSIS — E03.9 HYPOTHYROIDISM, UNSPECIFIED TYPE: Primary | ICD-10-CM

## 2017-07-19 RX ORDER — LEVOTHYROXINE SODIUM 150 UG/1
150 TABLET ORAL
Qty: 30 TAB | Refills: 2 | Status: SHIPPED | OUTPATIENT
Start: 2017-07-19 | End: 2017-10-16 | Stop reason: SDUPTHER

## 2017-07-19 NOTE — TELEPHONE ENCOUNTER
Pt is aware of results. She is currently taking levothyroxine 125 mcg I let her know that dr Vinicio Hernandez will send in 150 mcg. She is requesting [facility] near the [facility]. Pt expressed clear understanding and had no further questions.

## 2017-07-19 NOTE — TELEPHONE ENCOUNTER
Message  Received: Today       MD Sima Ribeiro LPN       Caller: Unspecified (Yesterday,  9:50 AM)                     Patient will need Synthroid.  She was on 125 mcg at one time if not on it it will be restarted.  If on it will need to be increased to 150 mcg.   She will also need low cholesterol diet.            Previous Messages          Called to let pt know of above message. Left message for patient at home number requesting return call.

## 2017-07-31 NOTE — PROGRESS NOTES
[facility] at [address]  [address]  Phone: [phone]      Fax:  [phone]    Discharge Summary    Patient name: Murtaza New Start of Care: 2017   Referral source: Fidel Parikh MD : 1955                         Medical Diagnosis: Pain in right knee [M25.561]  Pain in left knee [M25.562]  Other chronic pain [G89.29] Onset Date:                          Treatment Diagnosis: B knee pain and back pain   Prior Hospitalization: see medical history Provider#: [id number]   Medications: Verified on Patient summary List    Comorbidities: arthritis, latex allergy, thyroid problems, weight change of more than 10 lbs recently, latex allergy, tobacco use, spondylolisthesis L4-5, L5-S1.    Prior Level of Function: Reports having LBP and B knee pain for years because of heavy lifting. Visits from Start of Care: 2    Missed Visits: 3  Reporting Period : 2017 to 2017    Assessment/ Summary of Care:   Pt was seen for 2 therapy sessions. A re-certification was written on 2017 stating that the pt was \"very busy\" which made it difficult for her to attend therapy appointments. Pt NS her appointments on 2017 and 2017. The pt was called on 2017 and a message was left for her to schedule more appointments by 2017 or we will d/c pt from therapy. Per telephone log, pt did not call the clinic back to schedule more appointments. Pt is therefore d/c'ed from therapy and unable to reassess goals at this time.      RECOMMENDATIONS:  [x]Discontinue therapy: []Patient has reached or is progressing toward set goals      [x]Patient is non-compliant or has abdicated      []Due to lack of appreciable progress towards set goals    Zane Feliciano, PT 2017 1:03 PM

## 2017-10-16 ENCOUNTER — OFFICE VISIT (OUTPATIENT)
Dept: FAMILY MEDICINE CLINIC | Age: 62
End: 2017-10-16

## 2017-10-16 VITALS
OXYGEN SATURATION: 98 % | HEART RATE: 91 BPM | WEIGHT: 253 LBS | DIASTOLIC BLOOD PRESSURE: 82 MMHG | RESPIRATION RATE: 16 BRPM | BODY MASS INDEX: 49.67 KG/M2 | SYSTOLIC BLOOD PRESSURE: 134 MMHG | HEIGHT: 60 IN

## 2017-10-16 DIAGNOSIS — E78.00 HYPERCHOLESTEREMIA: ICD-10-CM

## 2017-10-16 DIAGNOSIS — E03.9 HYPOTHYROIDISM, UNSPECIFIED TYPE: Primary | ICD-10-CM

## 2017-10-16 DIAGNOSIS — Z12.11 COLON CANCER SCREENING: ICD-10-CM

## 2017-10-16 RX ORDER — LEVOTHYROXINE SODIUM 150 UG/1
150 TABLET ORAL
Qty: 30 TAB | Refills: 2 | Status: SHIPPED | OUTPATIENT
Start: 2017-10-16 | End: 2017-10-19 | Stop reason: SDUPTHER

## 2017-10-16 NOTE — PROGRESS NOTES
Chief Complaint   Patient presents with    Thyroid Problem     1. Have you been to the ER, urgent care clinic since your last visit? Hospitalized since your last visit? No    2. Have you seen or consulted any other health care providers outside of the [address] [place] [address] since your last visit? Include any pap smears or colon screening.  No

## 2017-10-16 NOTE — PROGRESS NOTES
Florinda Simental is a 63 y.o. female  presents for follow up. No new complaints. Allergies   Allergen Reactions    Bactrim [Sulfamethoprim Ds] Rash     Patient gets full bodied rash.  Ibuprofen Other (comments)     headaches    Pcn [Penicillins] Itching    Sulfa (Sulfonamide Antibiotics) Hives     Outpatient Prescriptions Marked as Taking for the 10/16/17 encounter (Office Visit) with Chris Weathers MD   Medication Sig Dispense Refill    levothyroxine (SYNTHROID) 150 mcg tablet Take 1 Tab by mouth Daily (before breakfast). 30 Tab 2    multivitamin (ONE A DAY) tablet Take 1 Tab by mouth daily.       furosemide (LASIX) 20 mg tablet TAKE ONE TABLET BY MOUTH ONCE DAILY 30 Tab 0     Patient Active Problem List   Diagnosis Code    Hypothyroid E03.9    Tobacco abuse Z72.0    Hypercholesteremia E78.00    Chronic low back pain M54.5, G89.29    Non morbid obesity due to excess calories E66.09    ACP (advance care planning) Z71.89     Past Medical History:   Diagnosis Date    Chronic low back pain 3/11/2013    Hypothyroid     Spondylisthesis     Thyroid disease     Tobacco abuse 11/3/2011     Social History     Social History    Marital status: SINGLE     Spouse name: N/A    Number of children: N/A    Years of education: N/A     Social History Main Topics    Smoking status: Current Every Day Smoker     Packs/day: 1.00     Years: 40.00    Smokeless tobacco: Never Used      Comment: tried snorting snuff to clear sinuses    Alcohol use No    Drug use: No    Sexual activity: Yes     Partners: Male     Other Topics Concern     Service No     parents and spouse were in the [address] [place] [address] Blood Transfusions No    Caffeine Concern No    Occupational Exposure Yes     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 Cardiovascular: Negative for chest pain and palpitations. Gastrointestinal: Negative for constipation, diarrhea, nausea and vomiting. Psychiatric/Behavioral: Negative. Vitals:    10/16/17 1535   BP: 134/82   Pulse: 91   Resp: 16   SpO2: 98%   Weight: 253 lb (114.8 kg)   Height: 5' (1.524 m)   PainSc:   5   PainLoc: Knee       Physical Exam   Constitutional: She is oriented to person, place, and time and well-developed, well-nourished, and in no distress. Cardiovascular: Normal rate, regular rhythm and normal heart sounds. Pulmonary/Chest: Effort normal and breath sounds normal.   Neurological: She is alert and oriented to person, place, and time. Gait normal.   Skin: Skin is warm and dry. Psychiatric: Mood, memory, affect and judgment normal.   Nursing note and vitals reviewed. Assessment/Plan      ICD-10-CM ICD-9-CM    1. Hypothyroidism, unspecified type E03.9 244.9 TSH 3RD GENERATION      levothyroxine (SYNTHROID) 150 mcg tablet   2. Hypercholesteremia E78.00 272.0    3. Colon cancer screening Z12.11 V76.51 OCCULT BLOOD, IMMUNOASSAY (FIT)     I have discussed the diagnosis with the patient and the intended plan of care as seen in the above orders. The patient has received an after-visit summary and questions were answered concerning future plans. I have discussed medication, side effects, and warnings with the patient in detail. The patient verbalized understanding and is in agreement with the plan of care. The patient will contact the office with any additional concerns. Follow-up Disposition:  Return in about 1 month (around 11/16/2017).   lab results and schedule of future lab studies reviewed with patient    Nadege Wong MD

## 2017-10-16 NOTE — MR AVS SNAPSHOT
Visit Information Date & Time Provider Department Dept. Phone Encounter #  
 10/16/2017  3:00 PM Nighat Caputo MD [facility] 14 [phone] [id number] Follow-up Instructions Return in about 1 month (around 11/16/2017). Upcoming Health Maintenance Date Due FOBT Q 1 YEAR AGE 50-75 11/3/2012 PAP AKA CERVICAL CYTOLOGY 11/18/2014 BREAST CANCER SCRN MAMMOGRAM 10/16/2019 DTaP/Tdap/Td series (2 - Td) 10/16/2027 Allergies as of 10/16/2017  Review Complete On: 10/16/2017 By: Nighat Caputo MD  
  
 Severity Noted Reaction Type Reactions Bactrim [Sulfamethoprim Ds]  12/30/2013   Systemic Rash Patient gets full bodied rash. Ibuprofen  05/12/2010    Other (comments)  
 headaches Pcn [Penicillins]  05/12/2010    Itching Sulfa (Sulfonamide Antibiotics)  07/25/2015    Hives Current Immunizations  Reviewed on 10/3/2016 No immunizations on file. Not reviewed this visit You Were Diagnosed With   
  
 Codes Comments Hypothyroidism, unspecified type    -  Primary ICD-10-CM: E03.9 ICD-9-CM: 244.9 Hypercholesteremia     ICD-10-CM: E78.00 ICD-9-CM: 272.0 Colon cancer screening     ICD-10-CM: Z12.11 ICD-9-CM: V76.51 Vitals BP Pulse Resp Height(growth percentile) Weight(growth percentile) SpO2  
 134/82 (BP 1 Location: Left arm, BP Patient Position: Sitting) 91 16 5' (1.524 m) 253 lb (114.8 kg) 98% BMI OB Status Smoking Status 49.41 kg/m2 Postmenopausal Current Every Day Smoker Vitals History BMI and BSA Data Body Mass Index Body Surface Area  
 49.41 kg/m 2 2.2 m 2 Preferred Pharmacy Pharmacy Name Phone [facility] [address] [phone] Your Updated Medication List  
  
   
This list is accurate as of: 10/16/17  3:50 PM.  Always use your most recent med list.  
  
  
  
  
 ACEROLA C-500 PO Take  by mouth. furosemide 20 mg tablet Commonly known as:  LASIX TAKE ONE TABLET BY MOUTH ONCE DAILY  
  
 levothyroxine 150 mcg tablet Commonly known as:  SYNTHROID Take 1 Tab by mouth Daily (before breakfast). multivitamin tablet Commonly known as:  ONE A DAY Take 1 Tab by mouth daily. Prescriptions Printed Refills  
 levothyroxine (SYNTHROID) 150 mcg tablet 2 Sig: Take 1 Tab by mouth Daily (before breakfast). Class: Print Route: Oral  
  
Follow-up Instructions Return in about 1 month (around 11/16/2017). To-Do List   
 10/16/2017 Lab:  TSH 3RD GENERATION   
  
 11/15/2017 Lab:  OCCULT BLOOD, IMMUNOASSAY (FIT) Patient Instructions Hypothyroidism: Care Instructions Your Care Instructions You have hypothyroidism, which means that your body is not making enough thyroid hormone. This hormone helps your body use energy. If your thyroid level is low, you may feel tired, be constipated, have an increase in your blood pressure, or have dry skin or memory problems. You may also get cold easily, even when it is warm. Women with low thyroid levels may have heavy menstrual periods. A blood test to find your thyroid-stimulating hormone (TSH) level is used to check for hypothyroidism. A high TSH level may mean that you have low thyroid. When your body is not making enough thyroid hormone, TSH levels rise in an effort to make the body produce more. The treatment for hypothyroidism is to take thyroid hormone pills. You should start to feel better in 1 to 2 weeks. But it can take several months to see changes in the TSH level. You will need regular visits with your doctor to make sure you have the right dose of medicine. Most people need treatment for the rest of their lives. You will need to see your doctor regularly to have blood tests and to make sure you are doing well. Follow-up care is a key part of your treatment and safety.  Be sure to make and go to all appointments, and call your doctor if you are having problems. Its also a good idea to know your test results and keep a list of the medicines you take. How can you care for yourself at home? · Take your thyroid hormone medicine exactly as prescribed. Call your doctor if you think you are having a problem with your medicine. Most people do not have side effects if they take the right amount of medicine regularly. ¨ Take the medicine 30 minutes before breakfast, and do not take it with calcium, vitamins, or iron. ¨ Do not take extra doses of your thyroid medicine. It will not help you get better any faster, and it may cause side effects. ¨ If you forget to take a dose, do NOT take a double dose of medicine. Take your usual dose the next day. · Tell your doctor about all prescription, herbal, or over-the-counter products you take. · Take care of yourself. Eat a healthy diet, get enough sleep, and get regular exercise. When should you call for help? Call 911 anytime you think you may need emergency care. For example, call if: 
· You passed out (lost consciousness). · You have severe trouble breathing. · You have a very slow heartbeat (less than 60 beats a minute). · You have a low body temperature (95°F or below). Call your doctor now or seek immediate medical care if: 
· You feel tired, sluggish, or weak. · You have trouble remembering things or concentrating. · You do not begin to feel better 2 weeks after starting your medicine. Watch closely for changes in your health, and be sure to contact your doctor if you have any problems. Where can you learn more? Go to http://nathan-da.info/. Enter S930 in the search box to learn more about \"Hypothyroidism: Care Instructions. \" Current as of: July 28, 2016 Content Version: 11.3 © 8279-6915 [employer].  Care instructions adapted under license by [address] (which disclaims liability or warranty for this information). If you have questions about a medical condition or this instruction, always ask your healthcare professional. [address] any warranty or liability for your use of this information. Introducing \[facility]\"" & HEALTH SERVICES! Dear Layton Whitney: Thank you for requesting a [employer] account. Our records indicate that you already have an active [employer] account. You can access your account anytime at [email or web address] Did you know that you can access your hospital and ER discharge instructions at any time in [employer]? You can also review all of your test results from your hospital stay or ER visit. Additional Information If you have questions, please visit the Frequently Asked Questions section of the [employer] website at [email or web address] Remember, [employer] is NOT to be used for urgent needs. For medical emergencies, dial 911. Now available from your iPhone and Android! Please provide this summary of care documentation to your next provider. Your primary care clinician is listed as [address]. If you have any questions after today's visit, please call [phone].

## 2017-10-16 NOTE — PATIENT INSTRUCTIONS
Hypothyroidism: Care Instructions  Your Care Instructions  You have hypothyroidism, which means that your body is not making enough thyroid hormone. This hormone helps your body use energy. If your thyroid level is low, you may feel tired, be constipated, have an increase in your blood pressure, or have dry skin or memory problems. You may also get cold easily, even when it is warm. Women with low thyroid levels may have heavy menstrual periods. A blood test to find your thyroid-stimulating hormone (TSH) level is used to check for hypothyroidism. A high TSH level may mean that you have low thyroid. When your body is not making enough thyroid hormone, TSH levels rise in an effort to make the body produce more. The treatment for hypothyroidism is to take thyroid hormone pills. You should start to feel better in 1 to 2 weeks. But it can take several months to see changes in the TSH level. You will need regular visits with your doctor to make sure you have the right dose of medicine. Most people need treatment for the rest of their lives. You will need to see your doctor regularly to have blood tests and to make sure you are doing well. Follow-up care is a key part of your treatment and safety. Be sure to make and go to all appointments, and call your doctor if you are having problems. Its also a good idea to know your test results and keep a list of the medicines you take. How can you care for yourself at home? · Take your thyroid hormone medicine exactly as prescribed. Call your doctor if you think you are having a problem with your medicine. Most people do not have side effects if they take the right amount of medicine regularly. ¨ Take the medicine 30 minutes before breakfast, and do not take it with calcium, vitamins, or iron. ¨ Do not take extra doses of your thyroid medicine. It will not help you get better any faster, and it may cause side effects.   ¨ If you forget to take a dose, do NOT take a double dose of medicine. Take your usual dose the next day. · Tell your doctor about all prescription, herbal, or over-the-counter products you take. · Take care of yourself. Eat a healthy diet, get enough sleep, and get regular exercise. When should you call for help? Call 911 anytime you think you may need emergency care. For example, call if:  · You passed out (lost consciousness). · You have severe trouble breathing. · You have a very slow heartbeat (less than 60 beats a minute). · You have a low body temperature (95°F or below). Call your doctor now or seek immediate medical care if:  · You feel tired, sluggish, or weak. · You have trouble remembering things or concentrating. · You do not begin to feel better 2 weeks after starting your medicine. Watch closely for changes in your health, and be sure to contact your doctor if you have any problems. Where can you learn more? Go to http://nathan-da.info/. Enter H400 in the search box to learn more about \"Hypothyroidism: Care Instructions. \"  Current as of: July 28, 2016  Content Version: 11.3  © 5143-0754 [facility]. Care instructions adapted under license by [employer] (which disclaims liability or warranty for this information). If you have questions about a medical condition or this instruction, always ask your healthcare professional. [address] any warranty or liability for your use of this information.

## 2017-10-17 LAB — TSH SERPL DL<=0.005 MIU/L-ACNC: 13.33 UIU/ML (ref 0.45–4.5)

## 2017-10-19 DIAGNOSIS — E03.9 HYPOTHYROIDISM, UNSPECIFIED TYPE: ICD-10-CM

## 2017-10-19 RX ORDER — LEVOTHYROXINE SODIUM 175 UG/1
175 TABLET ORAL
Qty: 30 TAB | Refills: 1 | Status: SHIPPED | OUTPATIENT
Start: 2017-10-19 | End: 2017-11-21 | Stop reason: SDUPTHER

## 2017-11-19 LAB — HEMOCCULT STL QL IA: NEGATIVE

## 2017-11-21 ENCOUNTER — OFFICE VISIT (OUTPATIENT)
Dept: FAMILY MEDICINE CLINIC | Age: 62
End: 2017-11-21

## 2017-11-21 VITALS
HEIGHT: 60 IN | TEMPERATURE: 98.5 F | WEIGHT: 252.6 LBS | DIASTOLIC BLOOD PRESSURE: 84 MMHG | RESPIRATION RATE: 12 BRPM | HEART RATE: 92 BPM | SYSTOLIC BLOOD PRESSURE: 124 MMHG | OXYGEN SATURATION: 97 % | BODY MASS INDEX: 49.59 KG/M2

## 2017-11-21 DIAGNOSIS — E78.00 HYPERCHOLESTEREMIA: ICD-10-CM

## 2017-11-21 DIAGNOSIS — M54.50 CHRONIC LOW BACK PAIN WITHOUT SCIATICA, UNSPECIFIED BACK PAIN LATERALITY: ICD-10-CM

## 2017-11-21 DIAGNOSIS — E03.9 HYPOTHYROIDISM, UNSPECIFIED TYPE: ICD-10-CM

## 2017-11-21 DIAGNOSIS — G89.29 CHRONIC LOW BACK PAIN WITHOUT SCIATICA, UNSPECIFIED BACK PAIN LATERALITY: ICD-10-CM

## 2017-11-21 DIAGNOSIS — Z01.419 WELL WOMAN EXAM WITH ROUTINE GYNECOLOGICAL EXAM: Primary | ICD-10-CM

## 2017-11-21 RX ORDER — LEVOTHYROXINE SODIUM 200 UG/1
200 TABLET ORAL
Qty: 30 TAB | Refills: 1 | Status: SHIPPED | OUTPATIENT
Start: 2017-11-21 | End: 2017-12-15 | Stop reason: SDUPTHER

## 2017-11-21 NOTE — PROGRESS NOTES
Héctor Hamm is a 63 y.o. female  presents for well woman. Allergies   Allergen Reactions    Bactrim [Sulfamethoprim Ds] Rash     Patient gets full bodied rash.  Ibuprofen Other (comments)     headaches    Pcn [Penicillins] Itching    Sulfa (Sulfonamide Antibiotics) Hives     Outpatient Prescriptions Marked as Taking for the 11/21/17 encounter (Office Visit) with Felicia De Guzman MD   Medication Sig Dispense Refill    levothyroxine (SYNTHROID) 175 mcg tablet Take 1 Tab by mouth Daily (before breakfast). 30 Tab 1    multivitamin (ONE A DAY) tablet Take 1 Tab by mouth daily.  furosemide (LASIX) 20 mg tablet TAKE ONE TABLET BY MOUTH ONCE DAILY 30 Tab 0    ASCORBIC ACID (ACEROLA C-500 PO) Take  by mouth.        Patient Active Problem List   Diagnosis Code    Hypothyroid E03.9    Tobacco abuse Z72.0    Hypercholesteremia E78.00    Chronic low back pain M54.5, G89.29    Non morbid obesity due to excess calories E66.09    ACP (advance care planning) Z71.89     Past Medical History:   Diagnosis Date    Chronic low back pain 3/11/2013    Hypothyroid     Spondylisthesis     Thyroid disease     Tobacco abuse 11/3/2011     Social History     Social History    Marital status: SINGLE     Spouse name: N/A    Number of children: N/A    Years of education: N/A     Social History Main Topics    Smoking status: Current Every Day Smoker     Packs/day: 1.00     Years: 40.00    Smokeless tobacco: Never Used      Comment: tried snorting snuff to clear sinuses    Alcohol use No    Drug use: No    Sexual activity: Yes     Partners: Male     Other Topics Concern     Service No     parents and spouse were in the [address] [place] [address] Blood Transfusions No    Caffeine Concern No    Occupational Exposure Yes     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 Cardiovascular: Negative for chest pain and palpitations. Gastrointestinal: Negative for constipation, diarrhea, nausea and vomiting. Musculoskeletal: Positive for back pain. Negative for myalgias and neck pain. Neurological: Negative. Vitals:    11/21/17 1448   BP: 124/84   Pulse: 92   Resp: 12   Temp: 98.5 °F (36.9 °C)   TempSrc: Oral   SpO2: 97%   Weight: 252 lb 9.6 oz (114.6 kg)   Height: 5' (1.524 m)   PainSc:   6   PainLoc: Back         Physical Exam   Constitutional: She is oriented to person, place, and time and well-developed, well-nourished, and in no distress. HENT:   Right Ear: External ear normal.   Left Ear: External ear normal.   Nose: Nose normal.   Mouth/Throat: Oropharynx is clear and moist.   Eyes: Conjunctivae are normal. Pupils are equal, round, and reactive to light. Neck: Normal range of motion. Neck supple. Cardiovascular: Normal rate, regular rhythm and normal heart sounds. Pulmonary/Chest: Effort normal and breath sounds normal.   Abdominal: Soft. Bowel sounds are normal.   Genitourinary: Vagina normal and cervix normal.   Musculoskeletal: Normal range of motion. She exhibits no edema or tenderness. Neurological: She is alert and oriented to person, place, and time. Gait normal.   Skin: Skin is warm and dry. Psychiatric: Mood, memory, affect and judgment normal.   Nursing note and vitals reviewed. Assessment/Plan      ICD-10-CM ICD-9-CM    1. Well woman exam with routine gynecological exam Z01.419 V72.31 PAP, LB, RFX HPV [id number])   2. Hypercholesteremia E78.00 272.0    3.  Hypothyroidism, unspecified type E03.9 244.9 levothyroxine (SYNTHROID) 200 mcg tablet   4. Chronic low back pain without sciatica, unspecified back pain laterality M54.5 724.2     G89.29 338.29    5. Class 3 obesity due to excess calories without serious comorbidity with body mass index (BMI) of 45.0 to 49.9 in adult [facility]) E66.09 278.00     Z68.42 V85.42      Follow-up Disposition:  Return in about 1 month (around 12/21/2017). lab results and schedule of future lab studies reviewed with patient    Form completed and copied. I have discussed the diagnosis with the patient and the intended plan of care as seen in the above orders. The patient has received an after-visit summary and questions were answered concerning future plans. I have discussed medication, side effects, and warnings with the patient in detail. The patient verbalized understanding and is in agreement with the plan of care. The patient will contact the office with any additional concerns. Discussed the patient's BMI with her. The BMI follow up plan is as follows:     dietary management education, guidance, and counseling  encourage exercise  monitor weight  prescribed dietary intake    An After Visit Summary was printed and given to the patient.     Miguel Turk MD

## 2017-11-21 NOTE — PATIENT INSTRUCTIONS
Body Mass Index: Care Instructions  Your Care Instructions    Body mass index (BMI) can help you see if your weight is raising your risk for health problems. It uses a formula to compare how much you weigh with how tall you are. · A BMI lower than 18.5 is considered underweight. · A BMI between 18.5 and 24.9 is considered healthy. · A BMI between 25 and 29.9 is considered overweight. A BMI of 30 or higher is considered obese. If your BMI is in the normal range, it means that you have a lower risk for weight-related health problems. If your BMI is in the overweight or obese range, you may be at increased risk for weight-related health problems, such as high blood pressure, heart disease, stroke, arthritis or joint pain, and diabetes. If your BMI is in the underweight range, you may be at increased risk for health problems such as fatigue, lower protection (immunity) against illness, muscle loss, bone loss, hair loss, and hormone problems. BMI is just one measure of your risk for weight-related health problems. You may be at higher risk for health problems if you are not active, you eat an unhealthy diet, or you drink too much alcohol or use tobacco products. Follow-up care is a key part of your treatment and safety. Be sure to make and go to all appointments, and call your doctor if you are having problems. It's also a good idea to know your test results and keep a list of the medicines you take. How can you care for yourself at home? · Practice healthy eating habits. This includes eating plenty of fruits, vegetables, whole grains, lean protein, and low-fat dairy. · If your doctor recommends it, get more exercise. Walking is a good choice. Bit by bit, increase the amount you walk every day. Try for at least 30 minutes on most days of the week. · Do not smoke. Smoking can increase your risk for health problems. If you need help quitting, talk to your doctor about stop-smoking programs and medicines. These can increase your chances of quitting for good. · Limit alcohol to 2 drinks a day for men and 1 drink a day for women. Too much alcohol can cause health problems. If you have a BMI higher than 25  · Your doctor may do other tests to check your risk for weight-related health problems. This may include measuring the distance around your waist. A waist measurement of more than 40 inches in men or 35 inches in women can increase the risk of weight-related health problems. · Talk with your doctor about steps you can take to stay healthy or improve your health. You may need to make lifestyle changes to lose weight and stay healthy, such as changing your diet and getting regular exercise. If you have a BMI lower than 18.5  · Your doctor may do other tests to check your risk for health problems. · Talk with your doctor about steps you can take to stay healthy or improve your health. You may need to make lifestyle changes to gain or maintain weight and stay healthy, such as getting more healthy foods in your diet and doing exercises to build muscle. Where can you learn more? Go to http://nathan-da.info/. Enter S176 in the search box to learn more about \"Body Mass Index: Care Instructions. \"  Current as of: October 13, 2016  Content Version: 11.4  © 0140-2153 [employer]. Care instructions adapted under license by [employer] (which disclaims liability or warranty for this information). If you have questions about a medical condition or this instruction, always ask your healthcare professional. [address] any warranty or liability for your use of this information. Body Mass Index: Care Instructions  Your Care Instructions    Body mass index (BMI) can help you see if your weight is raising your risk for health problems. It uses a formula to compare how much you weigh with how tall you are.   · A BMI lower than 18.5 is considered underweight. · A BMI between 18.5 and 24.9 is considered healthy. · A BMI between 25 and 29.9 is considered overweight. A BMI of 30 or higher is considered obese. If your BMI is in the normal range, it means that you have a lower risk for weight-related health problems. If your BMI is in the overweight or obese range, you may be at increased risk for weight-related health problems, such as high blood pressure, heart disease, stroke, arthritis or joint pain, and diabetes. If your BMI is in the underweight range, you may be at increased risk for health problems such as fatigue, lower protection (immunity) against illness, muscle loss, bone loss, hair loss, and hormone problems. BMI is just one measure of your risk for weight-related health problems. You may be at higher risk for health problems if you are not active, you eat an unhealthy diet, or you drink too much alcohol or use tobacco products. Follow-up care is a key part of your treatment and safety. Be sure to make and go to all appointments, and call your doctor if you are having problems. It's also a good idea to know your test results and keep a list of the medicines you take. How can you care for yourself at home? · Practice healthy eating habits. This includes eating plenty of fruits, vegetables, whole grains, lean protein, and low-fat dairy. · If your doctor recommends it, get more exercise. Walking is a good choice. Bit by bit, increase the amount you walk every day. Try for at least 30 minutes on most days of the week. · Do not smoke. Smoking can increase your risk for health problems. If you need help quitting, talk to your doctor about stop-smoking programs and medicines. These can increase your chances of quitting for good. · Limit alcohol to 2 drinks a day for men and 1 drink a day for women. Too much alcohol can cause health problems.   If you have a BMI higher than 25  · Your doctor may do other tests to check your risk for weight-related health problems. This may include measuring the distance around your waist. A waist measurement of more than 40 inches in men or 35 inches in women can increase the risk of weight-related health problems. · Talk with your doctor about steps you can take to stay healthy or improve your health. You may need to make lifestyle changes to lose weight and stay healthy, such as changing your diet and getting regular exercise. If you have a BMI lower than 18.5  · Your doctor may do other tests to check your risk for health problems. · Talk with your doctor about steps you can take to stay healthy or improve your health. You may need to make lifestyle changes to gain or maintain weight and stay healthy, such as getting more healthy foods in your diet and doing exercises to build muscle. Where can you learn more? Go to http://nathan-da.info/. Enter S176 in the search box to learn more about \"Body Mass Index: Care Instructions. \"  Current as of: October 13, 2016  Content Version: 11.4  © 4791-3149 [employer]. Care instructions adapted under license by [employer] (which disclaims liability or warranty for this information). If you have questions about a medical condition or this instruction, always ask your healthcare professional. [address] any warranty or liability for your use of this information.

## 2017-11-21 NOTE — PROGRESS NOTES
Patient here for her well woman exam and pap smear. 1. Have you been to the ER, urgent care clinic since your last visit? Hospitalized since your last visit? No    2. Have you seen or consulted any other health care providers outside of the [address] [place] [address] since your last visit? Include any pap smears or colon screening.  No

## 2017-11-21 NOTE — MR AVS SNAPSHOT
Visit Information Date & Time Provider Department Dept. Phone Encounter #  
 11/21/2017  2:30 PM Nadege Wong MD [facility] [phone] [id number] Follow-up Instructions Return in about 1 month (around 12/21/2017). Upcoming Health Maintenance Date Due FOBT Q 1 YEAR AGE 50-75 11/17/2018 BREAST CANCER SCRN MAMMOGRAM 10/16/2019 PAP AKA CERVICAL CYTOLOGY 11/21/2020 DTaP/Tdap/Td series (2 - Td) 10/16/2027 Allergies as of 11/21/2017  Review Complete On: 11/21/2017 By: Nadege Wong MD  
  
 Severity Noted Reaction Type Reactions Bactrim [Sulfamethoprim Ds]  12/30/2013   Systemic Rash Patient gets full bodied rash. Ibuprofen  05/12/2010    Other (comments)  
 headaches Pcn [Penicillins]  05/12/2010    Itching Sulfa (Sulfonamide Antibiotics)  07/25/2015    Hives Current Immunizations  Reviewed on 10/3/2016 No immunizations on file. Not reviewed this visit You Were Diagnosed With   
  
 Codes Comments Well woman exam with routine gynecological exam    -  Primary ICD-10-CM: H55.030 ICD-9-CM: V72.31 Hypercholesteremia     ICD-10-CM: E78.00 ICD-9-CM: 272.0 Hypothyroidism, unspecified type     ICD-10-CM: E03.9 ICD-9-CM: 515. 9 Chronic low back pain without sciatica, unspecified back pain laterality     ICD-10-CM: M54.5, G89.29 ICD-9-CM: 724.2, 338.29 Class 3 obesity due to excess calories without serious comorbidity with body mass index (BMI) of 45.0 to 49.9 in adult [facility])     ICD-10-CM: E66.09, Z68.42 
ICD-9-CM: 278.00, V85.42 Vitals BP Pulse Temp Resp Height(growth percentile) Weight(growth percentile) 124/84 (BP 1 Location: Left arm, BP Patient Position: Sitting) 92 98.5 °F (36.9 °C) (Oral) 12 5' (1.524 m) 252 lb 9.6 oz (114.6 kg) SpO2 BMI OB Status Smoking Status 97% 49.33 kg/m2 Postmenopausal Current Every Day Smoker Vitals History BMI and BSA Data Body Mass Index Body Surface Area  
 49.33 kg/m 2 2.2 m 2 Preferred Pharmacy Pharmacy Name Phone [facility] [address] [phone] Your Updated Medication List  
  
   
This list is accurate as of: 11/21/17  3:09 PM.  Always use your most recent med list.  
  
  
  
  
 ACEROLA C-500 PO Take  by mouth. furosemide 20 mg tablet Commonly known as:  LASIX TAKE ONE TABLET BY MOUTH ONCE DAILY  
  
 levothyroxine 200 mcg tablet Commonly known as:  SYNTHROID Take 1 Tab by mouth Daily (before breakfast). multivitamin tablet Commonly known as:  ONE A DAY Take 1 Tab by mouth daily. Prescriptions Sent to Pharmacy Refills  
 levothyroxine (SYNTHROID) 200 mcg tablet 1 Sig: Take 1 Tab by mouth Daily (before breakfast). Class: Normal  
 Pharmacy: [address] Ph #: [phone] Route: Oral  
  
Follow-up Instructions Return in about 1 month (around 12/21/2017). To-Do List   
 11/21/2017 Pathology:  PAP, LB, RFX HPV PMVCO([id number]) Patient Instructions Body Mass Index: Care Instructions Your Care Instructions Body mass index (BMI) can help you see if your weight is raising your risk for health problems. It uses a formula to compare how much you weigh with how tall you are. · A BMI lower than 18.5 is considered underweight. · A BMI between 18.5 and 24.9 is considered healthy. · A BMI between 25 and 29.9 is considered overweight. A BMI of 30 or higher is considered obese. If your BMI is in the normal range, it means that you have a lower risk for weight-related health problems. If your BMI is in the overweight or obese range, you may be at increased risk for weight-related health problems, such as high blood pressure, heart disease, stroke, arthritis or joint pain, and diabetes.  If your BMI is in the underweight range, you may be at increased risk for health problems such as fatigue, lower protection (immunity) against illness, muscle loss, bone loss, hair loss, and hormone problems. BMI is just one measure of your risk for weight-related health problems. You may be at higher risk for health problems if you are not active, you eat an unhealthy diet, or you drink too much alcohol or use tobacco products. Follow-up care is a key part of your treatment and safety. Be sure to make and go to all appointments, and call your doctor if you are having problems. It's also a good idea to know your test results and keep a list of the medicines you take. How can you care for yourself at home? · Practice healthy eating habits. This includes eating plenty of fruits, vegetables, whole grains, lean protein, and low-fat dairy. · If your doctor recommends it, get more exercise. Walking is a good choice. Bit by bit, increase the amount you walk every day. Try for at least 30 minutes on most days of the week. · Do not smoke. Smoking can increase your risk for health problems. If you need help quitting, talk to your doctor about stop-smoking programs and medicines. These can increase your chances of quitting for good. · Limit alcohol to 2 drinks a day for men and 1 drink a day for women. Too much alcohol can cause health problems. If you have a BMI higher than 25 · Your doctor may do other tests to check your risk for weight-related health problems. This may include measuring the distance around your waist. A waist measurement of more than 40 inches in men or 35 inches in women can increase the risk of weight-related health problems. · Talk with your doctor about steps you can take to stay healthy or improve your health. You may need to make lifestyle changes to lose weight and stay healthy, such as changing your diet and getting regular exercise. If you have a BMI lower than 18.5 · Your doctor may do other tests to check your risk for health problems. · Talk with your doctor about steps you can take to stay healthy or improve your health. You may need to make lifestyle changes to gain or maintain weight and stay healthy, such as getting more healthy foods in your diet and doing exercises to build muscle. Where can you learn more? Go to http://nathan-da.info/. Enter S176 in the search box to learn more about \"Body Mass Index: Care Instructions. \" Current as of: October 13, 2016 Content Version: 11.4 © 7284-3218 [employer] Meet. Care instructions adapted under license by [employer] (which disclaims liability or warranty for this information). If you have questions about a medical condition or this instruction, always ask your healthcare professional. [address] any warranty or liability for your use of this information. Body Mass Index: Care Instructions Your Care Instructions Body mass index (BMI) can help you see if your weight is raising your risk for health problems. It uses a formula to compare how much you weigh with how tall you are. · A BMI lower than 18.5 is considered underweight. · A BMI between 18.5 and 24.9 is considered healthy. · A BMI between 25 and 29.9 is considered overweight. A BMI of 30 or higher is considered obese. If your BMI is in the normal range, it means that you have a lower risk for weight-related health problems. If your BMI is in the overweight or obese range, you may be at increased risk for weight-related health problems, such as high blood pressure, heart disease, stroke, arthritis or joint pain, and diabetes. If your BMI is in the underweight range, you may be at increased risk for health problems such as fatigue, lower protection (immunity) against illness, muscle loss, bone loss, hair loss, and hormone problems. BMI is just one measure of your risk for weight-related health problems. You may be at higher risk for health problems if you are not active, you eat an unhealthy diet, or you drink too much alcohol or use tobacco products. Follow-up care is a key part of your treatment and safety. Be sure to make and go to all appointments, and call your doctor if you are having problems. It's also a good idea to know your test results and keep a list of the medicines you take. How can you care for yourself at home? · Practice healthy eating habits. This includes eating plenty of fruits, vegetables, whole grains, lean protein, and low-fat dairy. · If your doctor recommends it, get more exercise. Walking is a good choice. Bit by bit, increase the amount you walk every day. Try for at least 30 minutes on most days of the week. · Do not smoke. Smoking can increase your risk for health problems. If you need help quitting, talk to your doctor about stop-smoking programs and medicines. These can increase your chances of quitting for good. · Limit alcohol to 2 drinks a day for men and 1 drink a day for women. Too much alcohol can cause health problems. If you have a BMI higher than 25 · Your doctor may do other tests to check your risk for weight-related health problems. This may include measuring the distance around your waist. A waist measurement of more than 40 inches in men or 35 inches in women can increase the risk of weight-related health problems. · Talk with your doctor about steps you can take to stay healthy or improve your health. You may need to make lifestyle changes to lose weight and stay healthy, such as changing your diet and getting regular exercise. If you have a BMI lower than 18.5 · Your doctor may do other tests to check your risk for health problems. · Talk with your doctor about steps you can take to stay healthy or improve your health.  You may need to make lifestyle changes to gain or maintain weight and stay healthy, such as getting more healthy foods in your diet and doing exercises to build muscle. Where can you learn more? Go to http://nathan-da.info/. Enter S176 in the search box to learn more about \"Body Mass Index: Care Instructions. \" Current as of: October 13, 2016 Content Version: 11.4 © 9894-7842 [employer]. Care instructions adapted under license by [employer] (which disclaims liability or warranty for this information). If you have questions about a medical condition or this instruction, always ask your healthcare professional. [address] any warranty or liability for your use of this information. Introducing \[facility]\"" & HEALTH SERVICES! Dear Benji Ventura: Thank you for requesting a [employer] account. Our records indicate that you already have an active [employer] account. You can access your account anytime at [email or web address] Did you know that you can access your hospital and ER discharge instructions at any time in [employer]? You can also review all of your test results from your hospital stay or ER visit. Additional Information If you have questions, please visit the Frequently Asked Questions section of the [employer] website at [email or web address] Remember, [employer] is NOT to be used for urgent needs. For medical emergencies, dial 911. Now available from your iPhone and Android! Please provide this summary of care documentation to your next provider. Your primary care clinician is listed as [address]. If you have any questions after today's visit, please call [phone].

## 2017-11-28 LAB
CYTOLOGIST CVX/VAG CYTO: NORMAL
DX ICD CODE: NORMAL
LABCORP, 190119: NORMAL
Lab: NORMAL
OTHER STN SPEC: NORMAL
PATH REPORT.FINAL DX SPEC: NORMAL
STAT OF ADQ CVX/VAG CYTO-IMP: NORMAL

## 2017-12-15 ENCOUNTER — OFFICE VISIT (OUTPATIENT)
Dept: FAMILY MEDICINE CLINIC | Age: 62
End: 2017-12-15

## 2017-12-15 VITALS
BODY MASS INDEX: 48.49 KG/M2 | RESPIRATION RATE: 15 BRPM | WEIGHT: 247 LBS | OXYGEN SATURATION: 99 % | SYSTOLIC BLOOD PRESSURE: 128 MMHG | HEIGHT: 60 IN | DIASTOLIC BLOOD PRESSURE: 78 MMHG | HEART RATE: 74 BPM

## 2017-12-15 DIAGNOSIS — E03.9 HYPOTHYROIDISM, UNSPECIFIED TYPE: Primary | ICD-10-CM

## 2017-12-15 RX ORDER — LEVOTHYROXINE SODIUM 200 UG/1
200 TABLET ORAL
Qty: 30 TAB | Refills: 4 | Status: SHIPPED | OUTPATIENT
Start: 2017-12-15 | End: 2018-03-19 | Stop reason: SDUPTHER

## 2017-12-15 NOTE — MR AVS SNAPSHOT
Visit Information Date & Time Provider Department Dept. Phone Encounter #  
 12/15/2017  8:45 AM Jesse Napier, [address] [facility] [id number] Follow-up Instructions Return if symptoms worsen or fail to improve. Upcoming Health Maintenance Date Due FOBT Q 1 YEAR AGE 50-75 11/17/2018 PAP AKA CERVICAL CYTOLOGY 11/21/2020 DTaP/Tdap/Td series (2 - Td) 10/16/2027 Allergies as of 12/15/2017  Review Complete On: 12/15/2017 By: Jesse Napier MD  
  
 Severity Noted Reaction Type Reactions Bactrim [Sulfamethoprim Ds]  12/30/2013   Systemic Rash Patient gets full bodied rash. Ibuprofen  05/12/2010    Other (comments)  
 headaches Pcn [Penicillins]  05/12/2010    Itching Sulfa (Sulfonamide Antibiotics)  07/25/2015    Hives Current Immunizations  Reviewed on 10/3/2016 No immunizations on file. Not reviewed this visit You Were Diagnosed With   
  
 Codes Comments Hypothyroidism, unspecified type    -  Primary ICD-10-CM: E03.9 ICD-9-CM: 168. 9 Vitals BP Pulse Resp Height(growth percentile) Weight(growth percentile) SpO2  
 128/78 74 15 5' (1.524 m) 247 lb (112 kg) 99% BMI OB Status Smoking Status 48.24 kg/m2 Postmenopausal Current Every Day Smoker Vitals History BMI and BSA Data Body Mass Index Body Surface Area  
 48.24 kg/m 2 2.18 m 2 Preferred Pharmacy Pharmacy Name Phone [facility] [address] [phone] Your Updated Medication List  
  
   
This list is accurate as of: 12/15/17  9:04 AM.  Always use your most recent med list.  
  
  
  
  
 ACEROLA C-500 PO Take  by mouth. furosemide 20 mg tablet Commonly known as:  LASIX TAKE ONE TABLET BY MOUTH ONCE DAILY  
  
 levothyroxine 200 mcg tablet Commonly known as:  SYNTHROID Take 1 Tab by mouth Daily (before breakfast). multivitamin tablet Commonly known as:  ONE A DAY Take 1 Tab by mouth daily. Prescriptions Printed Refills  
 levothyroxine (SYNTHROID) 200 mcg tablet 4 Sig: Take 1 Tab by mouth Daily (before breakfast). Class: Print Route: Oral  
  
Follow-up Instructions Return if symptoms worsen or fail to improve. Patient Instructions Hypothyroidism: Care Instructions Your Care Instructions You have hypothyroidism, which means that your body is not making enough thyroid hormone. This hormone helps your body use energy. If your thyroid level is low, you may feel tired, be constipated, have an increase in your blood pressure, or have dry skin or memory problems. You may also get cold easily, even when it is warm. Women with low thyroid levels may have heavy menstrual periods. A blood test to find your thyroid-stimulating hormone (TSH) level is used to check for hypothyroidism. A high TSH level may mean that you have low thyroid. When your body is not making enough thyroid hormone, TSH levels rise in an effort to make the body produce more. The treatment for hypothyroidism is to take thyroid hormone pills. You should start to feel better in 1 to 2 weeks. But it can take several months to see changes in the TSH level. You will need regular visits with your doctor to make sure you have the right dose of medicine. Most people need treatment for the rest of their lives. You will need to see your doctor regularly to have blood tests and to make sure you are doing well. Follow-up care is a key part of your treatment and safety. Be sure to make and go to all appointments, and call your doctor if you are having problems. It's also a good idea to know your test results and keep a list of the medicines you take. How can you care for yourself at home? · Take your thyroid hormone medicine exactly as prescribed. Call your doctor if you think you are having a problem with your medicine.  Most people do not have side effects if they take the right amount of medicine regularly. ¨ Take the medicine 30 minutes before breakfast, and do not take it with calcium, vitamins, or iron. ¨ Do not take extra doses of your thyroid medicine. It will not help you get better any faster, and it may cause side effects. ¨ If you forget to take a dose, do NOT take a double dose of medicine. Take your usual dose the next day. · Tell your doctor about all prescription, herbal, or over-the-counter products you take. · Take care of yourself. Eat a healthy diet, get enough sleep, and get regular exercise. When should you call for help? Call 911 anytime you think you may need emergency care. For example, call if: 
? · You passed out (lost consciousness). ? · You have severe trouble breathing. ? · You have a very slow heartbeat (less than 60 beats a minute). ? · You have a low body temperature (95°F or below). ?Call your doctor now or seek immediate medical care if: 
? · You feel tired, sluggish, or weak. ? · You have trouble remembering things or concentrating. ? · You do not begin to feel better 2 weeks after starting your medicine. ? Watch closely for changes in your health, and be sure to contact your doctor if you have any problems. Where can you learn more? Go to http://nathan-da.info/. Enter H568 in the search box to learn more about \"Hypothyroidism: Care Instructions. \" Current as of: May 12, 2017 Content Version: 11.4 © 6002-9665 [employer]B Systems. Care instructions adapted under license by [employer] (which disclaims liability or warranty for this information). If you have questions about a medical condition or this instruction, always ask your healthcare professional. [address] any warranty or liability for your use of this information. Introducing [facility] & HEALTH SERVICES!    
 Dear Socorro Loud: 
 Thank you for requesting a [employer] account. Our records indicate that you already have an active [employer] account. You can access your account anytime at [email or web address] Did you know that you can access your hospital and ER discharge instructions at any time in [employer]? You can also review all of your test results from your hospital stay or ER visit. Additional Information If you have questions, please visit the Frequently Asked Questions section of the [employer] website at [email or web address] Remember, [employer] is NOT to be used for urgent needs. For medical emergencies, dial 911. Now available from your iPhone and Android! Please provide this summary of care documentation to your next provider. Your primary care clinician is listed as [address]. If you have any questions after today's visit, please call [phone].

## 2017-12-15 NOTE — PATIENT INSTRUCTIONS

## 2017-12-15 NOTE — PROGRESS NOTES
Chief Complaint   Patient presents with    Thyroid Problem     labs need to be done as well     1. Have you been to the ER, urgent care clinic since your last visit? Hospitalized since your last visit? No    2. Have you seen or consulted any other health care providers outside of the [address] [place] [address] since your last visit? Include any pap smears or colon screening.  No

## 2017-12-15 NOTE — PROGRESS NOTES
Amaris Grurola is a 63 y.o. female  presents for follow up. No new complaints. Allergies   Allergen Reactions    Bactrim [Sulfamethoprim Ds] Rash     Patient gets full bodied rash.  Ibuprofen Other (comments)     headaches    Pcn [Penicillins] Itching    Sulfa (Sulfonamide Antibiotics) Hives     Outpatient Prescriptions Marked as Taking for the 12/15/17 encounter (Office Visit) with Jovany Hdez MD   Medication Sig Dispense Refill    levothyroxine (SYNTHROID) 200 mcg tablet Take 1 Tab by mouth Daily (before breakfast). 30 Tab 1    multivitamin (ONE A DAY) tablet Take 1 Tab by mouth daily.  furosemide (LASIX) 20 mg tablet TAKE ONE TABLET BY MOUTH ONCE DAILY 30 Tab 0    ASCORBIC ACID (ACEROLA C-500 PO) Take  by mouth.        Patient Active Problem List   Diagnosis Code    Hypothyroid E03.9    Tobacco abuse Z72.0    Hypercholesteremia E78.00    Chronic low back pain M54.5, G89.29    Non morbid obesity due to excess calories E66.09    ACP (advance care planning) Z71.89    Class 3 obesity due to excess calories without serious comorbidity with body mass index (BMI) of 45.0 to 49.9 in adult [facility]) E66.09, Z68.42     Past Medical History:   Diagnosis Date    Chronic low back pain 3/11/2013    Hypothyroid     Spondylisthesis     Thyroid disease     Tobacco abuse 11/3/2011     Social History     Social History    Marital status: SINGLE     Spouse name: N/A    Number of children: N/A    Years of education: N/A     Social History Main Topics    Smoking status: Current Every Day Smoker     Packs/day: 1.00     Years: 40.00    Smokeless tobacco: Never Used      Comment: tried snorting snuff to clear sinuses    Alcohol use No    Drug use: No    Sexual activity: Yes     Partners: Male     Other Topics Concern     Service No     parents and spouse were in the [address] [place] [address] Blood Transfusions No    Caffeine Concern No    Occupational Exposure Yes     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 HENT: Negative for congestion. Cardiovascular: Negative for chest pain and palpitations. Gastrointestinal: Negative for constipation, diarrhea, nausea and vomiting. Skin: Negative for rash. Vitals:    12/15/17 0846   BP: 128/78   Pulse: 74   Resp: 15   SpO2: 99%   Weight: 247 lb (112 kg)   Height: 5' (1.524 m)   PainSc:   2   PainLoc: Generalized       Physical Exam   Constitutional: She is oriented to person, place, and time and well-developed, well-nourished, and in no distress. Cardiovascular: Normal rate, regular rhythm and normal heart sounds. Pulmonary/Chest: Effort normal and breath sounds normal.   Neurological: She is alert and oriented to person, place, and time. Skin: Skin is warm and dry. Psychiatric: Mood, memory, affect and judgment normal.   Nursing note and vitals reviewed. Assessment/Plan      ICD-10-CM ICD-9-CM    1. Hypothyroidism, unspecified type E03.9 244.9 levothyroxine (SYNTHROID) 200 mcg tablet     I have discussed the diagnosis with the patient and the intended plan of care as seen in the above orders. The patient has received an after-visit summary and questions were answered concerning future plans. I have discussed medication, side effects, and warnings with the patient in detail. The patient verbalized understanding and is in agreement with the plan of care. The patient will contact the office with any additional concerns.       Follow-up Disposition:  Return if symptoms worsen or fail to improve.  lab results and schedule of future lab studies reviewed with patient    Niesha Busby MD

## 2018-01-31 ENCOUNTER — OFFICE VISIT (OUTPATIENT)
Dept: FAMILY MEDICINE CLINIC | Age: 63
End: 2018-01-31

## 2018-01-31 VITALS
OXYGEN SATURATION: 94 % | TEMPERATURE: 97.8 F | DIASTOLIC BLOOD PRESSURE: 92 MMHG | SYSTOLIC BLOOD PRESSURE: 144 MMHG | RESPIRATION RATE: 16 BRPM | HEIGHT: 60 IN | BODY MASS INDEX: 48.57 KG/M2 | WEIGHT: 247.38 LBS | HEART RATE: 69 BPM

## 2018-01-31 DIAGNOSIS — E03.9 HYPOTHYROIDISM, UNSPECIFIED TYPE: Primary | ICD-10-CM

## 2018-01-31 DIAGNOSIS — R60.9 EDEMA, UNSPECIFIED TYPE: ICD-10-CM

## 2018-01-31 RX ORDER — FUROSEMIDE 20 MG/1
TABLET ORAL
Qty: 30 TAB | Refills: 5 | Status: SHIPPED | OUTPATIENT
Start: 2018-01-31 | End: 2018-12-18 | Stop reason: SDUPTHER

## 2018-01-31 NOTE — PROGRESS NOTES
HISTORY OF PRESENT ILLNESS  Real Fry is a 63 y.o. female. HPI  Was going to a MD in [place] since living there but has since moved back  In Nov her synthroid was increased to 200mcg a day  Due for recheck, no improvement in fatigue yet  Did join swim [employer] to start water aerobics  Also needs refill of her lasix for edema/bp    The FamHx, SocHx, MedHx, Medication, and Allergy lists have been reviewed and updated in the chart. ROS  A comprehensive review of system was obtained and negative except findings in the HPI    Visit Vitals    BP (!) 144/92    Pulse 69    Temp 97.8 °F (36.6 °C) (Oral)    Resp 16    Ht 5' (1.524 m)    Wt 247 lb 6 oz (112.2 kg)    SpO2 94%    BMI 48.31 kg/m2     Physical Exam   Constitutional: She is oriented to person, place, and time. She appears well-developed and well-nourished. Neck: No JVD present. Cardiovascular: Normal rate, regular rhythm and intact distal pulses. Exam reveals no gallop and no friction rub. No murmur heard. Pulmonary/Chest: Effort normal and breath sounds normal. No respiratory distress. She has no wheezes. Musculoskeletal: She exhibits no edema. Neurological: She is alert and oriented to person, place, and time. Skin: Skin is warm. Nursing note and vitals reviewed. ASSESSMENT and PLAN  Encounter Diagnoses   Name Primary?  Hypothyroidism, unspecified type Yes    Edema, unspecified type      Orders Placed This Encounter    TSH 3RD GENERATION    furosemide (LASIX) 20 mg tablet     Adj dose of synthroid pending labs  Follow up 6 weeks recheck tsh and fasting labs for cholesterol and bp  I have discussed the diagnosis with the patient and the intended plan as seen in the above orders. The patient has received an after-visit summary and questions were answered concerning future plans. Patient conveyed understanding of the plan at the time of the visit.     Gentry Portillo, MSN, ANP  1/31/2018

## 2018-01-31 NOTE — PROGRESS NOTES
1. Have you been to the ER, urgent care clinic since your last visit? Hospitalized since your last visit? No    2. Have you seen or consulted any other health care providers outside of the [address] [place] [address] since your last visit? Include any pap smears or colon screening.  No    Chief Complaint   Patient presents with    Follow-up     1 mo f/u, hypothyroidism, was going to Dr Austin Lilly in [facility]

## 2018-01-31 NOTE — MR AVS SNAPSHOT
[address] [address] 
[phone] Patient: Franki Simpson MRN: [id number] RBX:73/9/3869 Visit Information Date & Time Provider Department Dept. Phone Encounter #  
 1/31/2018 10:30 AM Cassidy Santiago NP [facility] [phone] [id number] Follow-up Instructions Return for repeat TSH and fasting labs for cholesterol. Upcoming Health Maintenance Date Due FOBT Q 1 YEAR AGE 50-75 11/17/2018 BREAST CANCER SCRN MAMMOGRAM 10/16/2019 PAP AKA CERVICAL CYTOLOGY 11/21/2020 DTaP/Tdap/Td series (2 - Td) 10/16/2027 Allergies as of 1/31/2018  Review Complete On: 1/31/2018 By: Cassidy Santiago NP Severity Noted Reaction Type Reactions Bactrim [Sulfamethoprim Ds]  12/30/2013   Systemic Rash Patient gets full bodied rash. Ibuprofen  05/12/2010    Other (comments)  
 headaches Pcn [Penicillins]  05/12/2010    Itching Sulfa (Sulfonamide Antibiotics)  07/25/2015    Hives Current Immunizations  Reviewed on 10/3/2016 No immunizations on file. Not reviewed this visit You Were Diagnosed With   
  
 Codes Comments Hypothyroidism, unspecified type    -  Primary ICD-10-CM: E03.9 ICD-9-CM: 244.9 Edema, unspecified type     ICD-10-CM: R60.9 ICD-9-CM: 930. 3 Vitals BP Pulse Temp Resp Height(growth percentile) Weight(growth percentile) (!) 144/92 69 97.8 °F (36.6 °C) (Oral) 16 5' (1.524 m) 247 lb 6 oz (112.2 kg) SpO2 BMI OB Status Smoking Status 94% 48.31 kg/m2 Postmenopausal Current Every Day Smoker Vitals History BMI and BSA Data Body Mass Index Body Surface Area  
 48.31 kg/m 2 2.18 m 2 Preferred Pharmacy Pharmacy Name Phone [facility] [address] [address] [phone] Your Updated Medication List  
  
   
This list is accurate as of: 1/31/18 10:47 AM.  Always use your most recent med list.  
  
  
  
  
 ACEROLA C-500 PO Take  by mouth. furosemide 20 mg tablet Commonly known as:  LASIX TAKE ONE TABLET BY MOUTH ONCE DAILY  
  
 levothyroxine 200 mcg tablet Commonly known as:  SYNTHROID Take 1 Tab by mouth Daily (before breakfast). multivitamin tablet Commonly known as:  ONE A DAY Take 1 Tab by mouth daily. Prescriptions Sent to Pharmacy Refills  
 furosemide (LASIX) 20 mg tablet 5 Sig: TAKE ONE TABLET BY MOUTH ONCE DAILY Class: Normal  
 [facility] [address] [facility] #: [phone] We Performed the Following TSH 3RD GENERATION [39557 CPT(R)] Follow-up Instructions Return for repeat TSH and fasting labs for cholesterol. Introducing [facility] & HEALTH SERVICES! Dear Kit Garcia: Thank you for requesting a Billeo account. Our records indicate that you already have an active Billeo account. You can access your account anytime at [email or web address]. [email or web address]/Vicarious Did you know that you can access your hospital and ER discharge instructions at any time in Billeo? You can also review all of your test results from your hospital stay or ER visit. Additional Information If you have questions, please visit the Frequently Asked Questions section of the Billeo website at [email or web address] Remember, Billeo is NOT to be used for urgent needs. For medical emergencies, dial 911. Now available from your iPhone and Android! Please provide this summary of care documentation to your next provider. Your primary care clinician is listed as [phone] [facility]. If you have any questions after today's visit, please call [phone].

## 2018-02-01 LAB — TSH SERPL DL<=0.005 MIU/L-ACNC: 0.02 UIU/ML (ref 0.45–4.5)

## 2018-03-14 ENCOUNTER — OFFICE VISIT (OUTPATIENT)
Dept: FAMILY MEDICINE CLINIC | Age: 63
End: 2018-03-14

## 2018-03-14 VITALS
HEIGHT: 60 IN | BODY MASS INDEX: 48.1 KG/M2 | SYSTOLIC BLOOD PRESSURE: 138 MMHG | HEART RATE: 74 BPM | DIASTOLIC BLOOD PRESSURE: 94 MMHG | OXYGEN SATURATION: 97 % | WEIGHT: 245 LBS | RESPIRATION RATE: 17 BRPM | TEMPERATURE: 97.3 F

## 2018-03-14 DIAGNOSIS — E78.00 HYPERCHOLESTEREMIA: Primary | ICD-10-CM

## 2018-03-14 DIAGNOSIS — E03.9 HYPOTHYROIDISM, UNSPECIFIED TYPE: ICD-10-CM

## 2018-03-14 RX ORDER — LORATADINE 10 MG/1
10 TABLET ORAL DAILY
Qty: 30 TAB | Refills: 11 | Status: SHIPPED | OUTPATIENT
Start: 2018-03-14 | End: 2019-01-15 | Stop reason: ALTCHOICE

## 2018-03-14 NOTE — MR AVS SNAPSHOT
[address] [address] 
[phone] Patient: Carmel Borrego MRN: [id number] XQS:80/2/2088 Visit Information Date & Time Provider Department Dept. Phone Encounter #  
 3/14/2018  8:45 AM Sarah West, [address] [phone] [id number] Upcoming Health Maintenance Date Due FOBT Q 1 YEAR AGE 50-75 11/17/2018 BREAST CANCER SCRN MAMMOGRAM 10/16/2019 PAP AKA CERVICAL CYTOLOGY 11/21/2020 DTaP/Tdap/Td series (2 - Td) 10/16/2027 Allergies as of 3/14/2018  Review Complete On: 3/14/2018 By: Sarah West NP Severity Noted Reaction Type Reactions Bactrim [Sulfamethoprim Ds]  12/30/2013   Systemic Rash Patient gets full bodied rash. Ibuprofen  05/12/2010    Other (comments)  
 headaches Pcn [Penicillins]  05/12/2010    Itching Sulfa (Sulfonamide Antibiotics)  07/25/2015    Hives Current Immunizations  Reviewed on 10/3/2016 No immunizations on file. Not reviewed this visit You Were Diagnosed With   
  
 Codes Comments Hypercholesteremia    -  Primary ICD-10-CM: E78.00 ICD-9-CM: 272.0 Hypothyroidism, unspecified type     ICD-10-CM: E03.9 ICD-9-CM: 807. 9 Vitals BP Pulse Temp Resp Height(growth percentile) Weight(growth percentile) (!) 138/94 74 97.3 °F (36.3 °C) 17 5' (1.524 m) 245 lb (111.1 kg) SpO2 BMI OB Status Smoking Status 97% 47.85 kg/m2 Postmenopausal Current Every Day Smoker Vitals History BMI and BSA Data Body Mass Index Body Surface Area  
 47.85 kg/m 2 2.17 m 2 Preferred Pharmacy Pharmacy Name Phone [address] [place] [address] [phone] Your Updated Medication List  
  
   
This list is accurate as of 3/14/18  9:16 AM.  Always use your most recent med list.  
  
  
  
  
 ACEROLA C-500 PO Take  by mouth. furosemide 20 mg tablet Commonly known as:  LASIX TAKE ONE TABLET BY MOUTH ONCE DAILY  
  
 levothyroxine 200 mcg tablet Commonly known as:  SYNTHROID Take 1 Tab by mouth Daily (before breakfast). loratadine 10 mg tablet Commonly known as:  Cullen Bread Take 1 Tab by mouth daily. multivitamin tablet Commonly known as:  ONE A DAY Take 1 Tab by mouth daily. Prescriptions Sent to Pharmacy Refills  
 loratadine (CLARITIN) 10 mg tablet 11 Sig: Take 1 Tab by mouth daily. Class: Normal  
 Pharmacy: [facility] [address] MAGDA [address], [address] [place] Ph #: [phone] Route: Oral  
  
We Performed the Following ALT [id number] CPT(R)] AST R2225157 CPT(R)] LIPID PANEL [92138 CPT(R)] THYROID PANEL W/TSH [86720 CPT(R)] Introducing [facility] & [facility] SERVICES! Dear Baron Benton: Thank you for requesting a Street[employer]or account. Our records indicate that you already have an active StreetInvestor account. You can access your account anytime at [email or web address] PolySuite/[email or web address] Energy Did you know that you can access your hospital and ER discharge instructions at any time in StreetInvestor? You can also review all of your test results from your hospital stay or ER visit. Additional Information If you have questions, please visit the Frequently Asked Questions section of the StreetInvestor website at [email or web address] Remember, StreetInvestor is NOT to be used for urgent needs. For medical emergencies, dial 911. Now available from your iPhone and Android! Please provide this summary of care documentation to your next provider. Your primary care clinician is listed as [address]. If you have any questions after today's visit, please call [phone].

## 2018-03-14 NOTE — PROGRESS NOTES
HISTORY OF PRESENT ILLNESS  Luis Jo is a 63 y.o. female. HPI  Cardiovascular Review:  The patient has hyperlipidemia. Diet and Lifestyle: generally follows a low fat low cholesterol diet, generally follows a low sodium diet, exercises sporadically, nonsmoker  Home BP Monitoring: is not measured at home. Pertinent ROS: taking medications as instructed, no medication side effects noted, no TIA's, no chest pain on exertion, no dyspnea on exertion, no swelling of ankles. Thyroid Review:  Patient is seen for followup of hypothyroidism. Thyroid ROS: denies fatigue, weight changes, heat/cold intolerance, bowel/skin changes or CVS symptoms. Patient Active Problem List    Diagnosis Date Noted    Class 3 obesity due to excess calories without serious comorbidity with body mass index (BMI) of 45.0 to 49.9 in adult [facility]) 11/21/2017    ACP (advance care planning) 07/14/2017    Non morbid obesity due to excess calories 06/08/2017    Chronic low back pain 03/11/2013    Tobacco abuse 11/03/2011    Hypercholesteremia 11/03/2011    Hypothyroid 05/12/2010     Current Outpatient Prescriptions   Medication Sig Dispense Refill    loratadine (CLARITIN) 10 mg tablet Take 1 Tab by mouth daily. 30 Tab 11    furosemide (LASIX) 20 mg tablet TAKE ONE TABLET BY MOUTH ONCE DAILY 30 Tab 5    levothyroxine (SYNTHROID) 200 mcg tablet Take 1 Tab by mouth Daily (before breakfast). 30 Tab 4    multivitamin (ONE A DAY) tablet Take 1 Tab by mouth daily.  ASCORBIC ACID (ACEROLA C-500 PO) Take  by mouth.        Family History   Problem Relation Age of Onset    Diabetes Maternal Grandmother      Social History   Substance Use Topics    Smoking status: Current Every Day Smoker     Packs/day: 1.00     Years: 40.00    Smokeless tobacco: Never Used      Comment: tried snorting snuff to clear sinuses    Alcohol use No           ROS  A comprehensive review of system was obtained and negative except findings in the HPI    Visit Vitals    BP (!) 138/94    Pulse 74    Temp 97.3 °F (36.3 °C)    Resp 17    Ht 5' (1.524 m)    Wt 245 lb (111.1 kg)    SpO2 97%    BMI 47.85 kg/m2     Physical Exam   Constitutional: She is oriented to person, place, and time. She appears well-developed and well-nourished. Neck: No JVD present. Cardiovascular: Normal rate, regular rhythm and intact distal pulses. Exam reveals no gallop and no friction rub. No murmur heard. Pulmonary/Chest: Effort normal and breath sounds normal. No respiratory distress. She has no wheezes. Musculoskeletal: She exhibits no edema. Neurological: She is alert and oriented to person, place, and time. Skin: Skin is warm. Nursing note and vitals reviewed. ASSESSMENT and PLAN  Encounter Diagnoses   Name Primary?  Hypercholesteremia Yes    Hypothyroidism, unspecified type      Orders Placed This Encounter    LIPID PANEL    ALT    AST    THYROID PANEL W/TSH    loratadine (CLARITIN) 10 mg tablet     Labs updated today  Adjust meds pending results    I have discussed the diagnosis with the patient and the intended plan as seen in the above orders. The patient has received an after-visit summary and questions were answered concerning future plans. Patient conveyed understanding of the plan at the time of the visit.     Nadine Vasquez, MSN, ANP  3/14/2018

## 2018-03-15 LAB
ALT SERPL-CCNC: 15 IU/L (ref 0–32)
AST SERPL-CCNC: 15 IU/L (ref 0–40)
CHOLEST SERPL-MCNC: 218 MG/DL (ref 100–199)
FT4I SERPL CALC-MCNC: 2.2 (ref 1.2–4.9)
HDLC SERPL-MCNC: 48 MG/DL
INTERPRETATION, 910389: NORMAL
LDLC SERPL CALC-MCNC: 133 MG/DL (ref 0–99)
T3RU NFR SERPL: 27 % (ref 24–39)
T4 SERPL-MCNC: 8 UG/DL (ref 4.5–12)
TRIGL SERPL-MCNC: 187 MG/DL (ref 0–149)
TSH SERPL DL<=0.005 MIU/L-ACNC: 0.03 UIU/ML (ref 0.45–4.5)
VLDLC SERPL CALC-MCNC: 37 MG/DL (ref 5–40)

## 2018-03-19 DIAGNOSIS — E03.9 HYPOTHYROIDISM, UNSPECIFIED TYPE: ICD-10-CM

## 2018-03-19 RX ORDER — LEVOTHYROXINE SODIUM 175 UG/1
175 TABLET ORAL
Qty: 30 TAB | Refills: 2 | Status: SHIPPED | OUTPATIENT
Start: 2018-03-19 | End: 2018-07-15 | Stop reason: SDUPTHER

## 2018-03-19 NOTE — PROGRESS NOTES
Hey there, your labs show that your thyroid is way too fast. I need to decrease your dose to 175mcg a day and recheck in 6 weeks. Your cholesterol is also borderline high still but we need to get the thyroid in normal range to get an accurate cholesterol reading.   Rubi Noyola

## 2018-04-11 ENCOUNTER — OFFICE VISIT (OUTPATIENT)
Dept: FAMILY MEDICINE CLINIC | Age: 63
End: 2018-04-11

## 2018-04-11 VITALS
OXYGEN SATURATION: 97 % | DIASTOLIC BLOOD PRESSURE: 80 MMHG | BODY MASS INDEX: 46.86 KG/M2 | HEIGHT: 61 IN | SYSTOLIC BLOOD PRESSURE: 130 MMHG | WEIGHT: 248.2 LBS | HEART RATE: 71 BPM | TEMPERATURE: 98.5 F | RESPIRATION RATE: 20 BRPM

## 2018-04-11 DIAGNOSIS — J01.00 ACUTE MAXILLARY SINUSITIS, RECURRENCE NOT SPECIFIED: Primary | ICD-10-CM

## 2018-04-11 RX ORDER — DOXYCYCLINE 100 MG/1
100 TABLET ORAL 2 TIMES DAILY
Qty: 20 TAB | Refills: 0 | Status: SHIPPED | OUTPATIENT
Start: 2018-04-11 | End: 2018-04-21

## 2018-04-11 NOTE — MR AVS SNAPSHOT
[address] [address] 
[phone] Patient: Baljeet Leal MRN: [id number] ZFA:13/4/3827 Visit Information Date & Time Provider Department Dept. Phone Encounter #  
 4/11/2018  1:15 PM Chapincito Herrera [phone] [id number] Your Appointments 5/8/2018 10:15 AM  
ESTABLISHED PATIENT with Micheal Heck, NP [address] [facility] ([address]) Appt Note: fu thyroid [address] [address]  
[phone]  
  
   
 [address] [address] Upcoming Health Maintenance Date Due  
 MEDICARE YEARLY EXAM 7/15/2018 FOBT Q 1 YEAR AGE 50-75 11/17/2018 BREAST CANCER SCRN MAMMOGRAM 10/16/2019 PAP AKA CERVICAL CYTOLOGY 11/21/2020 DTaP/Tdap/Td series (2 - Td) 10/16/2027 Allergies as of 4/11/2018  Review Complete On: 4/11/2018 By: Edy Urbina LPN Severity Noted Reaction Type Reactions Bactrim [Sulfamethoprim Ds]  12/30/2013   Systemic Rash Patient gets full bodied rash. Ibuprofen  05/12/2010    Other (comments)  
 headaches Pcn [Penicillins]  05/12/2010    Itching Sulfa (Sulfonamide Antibiotics)  07/25/2015    Hives Current Immunizations  Reviewed on 10/3/2016 No immunizations on file. Not reviewed this visit You Were Diagnosed With   
  
 Codes Comments Acute maxillary sinusitis, recurrence not specified    -  Primary ICD-10-CM: J01.00 ICD-9-CM: 461.0 Vitals BP Pulse Temp Resp Height(growth percentile) Weight(growth percentile) 130/80 (BP 1 Location: Left arm, BP Patient Position: Sitting) 71 98.5 °F (36.9 °C) (Oral) 20 5' 1\" (1.549 m) 248 lb 3.2 oz (112.6 kg) SpO2 BMI OB Status Smoking Status 97% 46.9 kg/m2 Postmenopausal Current Every Day Smoker Vitals History BMI and BSA Data Body Mass Index Body Surface Area  46.9 kg/m 2 2.2 m 2  
  
  
 [facility] Name Phone [facility] [address] [place] [phone] Your Updated Medication List  
  
   
This list is accurate as of 4/11/18  1:37 PM.  Always use your most recent med list.  
  
  
  
  
 ACEROLA C-500 PO Take  by mouth. doxycycline 100 mg tablet Commonly known as:  ADOXA Take 1 Tab by mouth two (2) times a day for 10 days. furosemide 20 mg tablet Commonly known as:  LASIX TAKE ONE TABLET BY MOUTH ONCE DAILY  
  
 levothyroxine 175 mcg tablet Commonly known as:  SYNTHROID Take 1 Tab by mouth Daily (before breakfast). loratadine 10 mg tablet Commonly known as:  Ronold Gerry Take 1 Tab by mouth daily. multivitamin tablet Commonly known as:  ONE A DAY Take 1 Tab by mouth daily. Prescriptions Sent to Pharmacy Refills  
 doxycycline (ADOXA) 100 mg tablet 0 Sig: Take 1 Tab by mouth two (2) times a day for 10 days. Class: Normal  
 Pharmacy: [address] [place] [address] [address] Ph #: [phone] Route: Oral  
  
Introducing \[facility]\"" & HEALTH SERVICES! Dear Gail García: Thank you for requesting a [employer] account. Our records indicate that you already have an active [employer] account. You can access your account anytime at [email or web address]/[email or web address] Did you know that you can access your hospital and ER discharge instructions at any time in [employer]? You can also review all of your test results from your hospital stay or ER visit. Additional Information If you have questions, please visit the Frequently Asked Questions section of the [employer] website at [email or web address] Remember, [employer] is NOT to be used for urgent needs. For medical emergencies, dial 911. Now available from your iPhone and Android! Please provide this summary of care documentation to your next provider. Your primary care clinician is listed as [address] [facility]. If you have any questions after today's visit, please call [phone].

## 2018-04-11 NOTE — PROGRESS NOTES
Chief Complaint   Patient presents with    Nasal Congestion     Pt seen in the office today for nasal congestion and a productive cough x's 1 week  Pt has tried Mucinex, increasing her fluids as tolerated.  No relief

## 2018-04-11 NOTE — PROGRESS NOTES
HPI/ROS  Patient complains of bilateral ear pressure/pain. Symptoms include congestion, headache described as frontal, lightheadedness, low grade fever, post nasal drip, productive cough with  yellow colored sputum, sinus pressure, tooth pain and vertigo. Onset of symptoms was 5 days ago, gradually worsening since that time. Patient is drinking plenty of fluids. .  Past history is significant for no history of pneumonia or bronchitis. Patient is non-smoker. She is taking otc mucinex DM and not helping. Visit Vitals    /80 (BP 1 Location: Left arm, BP Patient Position: Sitting)    Pulse 71    Temp 98.5 °F (36.9 °C) (Oral)    Resp 20    Ht 5' 1\" (1.549 m)    Wt 248 lb 3.2 oz (112.6 kg)    SpO2 97%    BMI 46.9 kg/m2     Physical Examination:   GENERAL ASSESSMENT: well developed and well nourished  SKIN: normal color, no lesions  HEAD: normocephalic  EYES: normal eyes  EARS: external auditory canal: clear and tympanic membrane: yellow, bulging  NOSE: normal external appearance and nares patent  MOUTH: yellow exudates of OP  NECK: normal  CHEST: normal air exchange, no rales, no rhonchi, no wheezes, respiratory effort normal with no retractions  HEART: regular rate and rhythm, normal S1/S2, no murmurs  ABDOMEN:  not examined  EXTREMITY: not examined  NEURO: not examined    Diagnoses and all orders for this visit:    1. Acute maxillary sinusitis, recurrence not specified    Other orders  -     doxycycline (ADOXA) 100 mg tablet; Take 1 Tab by mouth two (2) times a day for 10 days. Reveiwed adr/se of medication  Push fluids, rest, suggested mucinex for congestion and drainage  Recheck 5-7 days if sx not improved. I have discussed the diagnosis with the patient and the intended plan as seen in the above orders. The patient has received an after-visit summary and questions were answered concerning future plans. Patient conveyed understanding of the plan at the time of the visit.     Noel Mejía, MSN, ANP  4/11/2018

## 2018-06-06 ENCOUNTER — OFFICE VISIT (OUTPATIENT)
Dept: FAMILY MEDICINE CLINIC | Age: 63
End: 2018-06-06

## 2018-06-06 VITALS
BODY MASS INDEX: 46.16 KG/M2 | WEIGHT: 244.5 LBS | HEIGHT: 61 IN | SYSTOLIC BLOOD PRESSURE: 122 MMHG | TEMPERATURE: 98.8 F | RESPIRATION RATE: 16 BRPM | OXYGEN SATURATION: 98 % | DIASTOLIC BLOOD PRESSURE: 84 MMHG | HEART RATE: 101 BPM

## 2018-06-06 DIAGNOSIS — E03.9 HYPOTHYROIDISM, UNSPECIFIED TYPE: Primary | ICD-10-CM

## 2018-06-06 NOTE — MR AVS SNAPSHOT
[address] [address] 
[phone] Patient: Oneda Goodell MRN: [id number] QEZ:80/1/0485 Visit Information Date & Time Provider Department Dept. Phone Encounter #  
 6/6/2018  3:30 PM Elvin Paget, [address] [phone] [id number] Upcoming Health Maintenance Date Due  
 MEDICARE YEARLY EXAM 7/15/2018 Influenza Age 5 to Adult 8/1/2018 FOBT Q 1 YEAR AGE 50-75 11/17/2018 BREAST CANCER SCRN MAMMOGRAM 10/16/2019 PAP AKA CERVICAL CYTOLOGY 11/21/2020 DTaP/Tdap/Td series (2 - Td) 10/16/2027 Allergies as of 6/6/2018  Review Complete On: 6/6/2018 By: Cleo Cardoza LPN Severity Noted Reaction Type Reactions Bactrim [Sulfamethoprim Ds]  12/30/2013   Systemic Rash Patient gets full bodied rash. Ibuprofen  05/12/2010    Other (comments)  
 headaches Pcn [Penicillins]  05/12/2010    Itching Sulfa (Sulfonamide Antibiotics)  07/25/2015    Hives Current Immunizations  Reviewed on 10/3/2016 No immunizations on file. Not reviewed this visit You Were Diagnosed With   
  
 Codes Comments Hypothyroidism, unspecified type    -  Primary ICD-10-CM: E03.9 ICD-9-CM: 396. 9 Vitals BP Pulse Temp Resp Height(growth percentile) Weight(growth percentile) 122/84 (!) 101 98.8 °F (37.1 °C) (Oral) 16 5' 1\" (1.549 m) 244 lb 8 oz (110.9 kg) SpO2 BMI OB Status Smoking Status 98% 46.2 kg/m2 Postmenopausal Current Every Day Smoker Vitals History BMI and BSA Data Body Mass Index Body Surface Area  
 46.2 kg/m 2 2.18 m 2 Preferred Pharmacy Pharmacy Name Phone [address] [phone] Your Updated Medication List  
  
   
This list is accurate as of 6/6/18  4:10 PM.  Always use your most recent med list.  
  
  
  
  
 ACEROLA C-500 PO Take  by mouth. furosemide 20 mg tablet Commonly known as:  LASIX TAKE ONE TABLET BY MOUTH ONCE DAILY  
  
 levothyroxine 175 mcg tablet Commonly known as:  SYNTHROID Take 1 Tab by mouth Daily (before breakfast). loratadine 10 mg tablet Commonly known as:  Adorno Canavan Take 1 Tab by mouth daily. multivitamin tablet Commonly known as:  ONE A DAY Take 1 Tab by mouth daily. We Performed the Following THYROID PANEL W/TSH [97574 CPT(R)] Introducing [facility] & [place] SERVICES! Dear Radha Salinas: Thank you for requesting a [employer] account. Our records indicate that you already have an active [employer] account. You can access your account anytime at [email or web address] [email or web address]. Laser Light Engines/Patient Communicator Did you know that you can access your hospital and ER discharge instructions at any time in [employer]? You can also review all of your test results from your hospital stay or ER visit. Additional Information If you have questions, please visit the Frequently Asked Questions section of the [employer] website at [email or web address] Laser Light [email or web address]/Patient [email or web address]/. Remember, [employer] is NOT to be used for urgent needs. For medical emergencies, dial 911. Now available from your iPhone and Android! Please provide this summary of care documentation to your next provider. Your primary care clinician is listed as [address]. If you have any questions after today's visit, please call [phone].

## 2018-06-07 LAB
FT4I SERPL CALC-MCNC: 3.3 (ref 1.2–4.9)
T3RU NFR SERPL: 30 % (ref 24–39)
T4 SERPL-MCNC: 11.1 UG/DL (ref 4.5–12)
TSH SERPL DL<=0.005 MIU/L-ACNC: 0.35 UIU/ML (ref 0.45–4.5)

## 2018-06-07 NOTE — PROGRESS NOTES
HISTORY OF PRESENT ILLNESS  Shobha Mancuso is a 63 y.o. female. HPI  Thyroid Review:  Patient is seen for followup of hypothyroidism. Thyroid ROS: denies fatigue, weight changes, heat/cold intolerance, bowel/skin changes or CVS symptoms. Patient Active Problem List    Diagnosis Date Noted    Class 3 obesity due to excess calories without serious comorbidity with body mass index (BMI) of 45.0 to 49.9 in adult [facility]) 11/21/2017    ACP (advance care planning) 07/14/2017    Non morbid obesity due to excess calories 06/08/2017    Chronic low back pain 03/11/2013    Tobacco abuse 11/03/2011    Hypercholesteremia 11/03/2011    Hypothyroid 05/12/2010     Current Outpatient Prescriptions   Medication Sig Dispense Refill    levothyroxine (SYNTHROID) 175 mcg tablet Take 1 Tab by mouth Daily (before breakfast). 30 Tab 2    loratadine (CLARITIN) 10 mg tablet Take 1 Tab by mouth daily. 30 Tab 11    furosemide (LASIX) 20 mg tablet TAKE ONE TABLET BY MOUTH ONCE DAILY 30 Tab 5    multivitamin (ONE A DAY) tablet Take 1 Tab by mouth daily.  ASCORBIC ACID (ACEROLA C-500 PO) Take  by mouth. ROS  A comprehensive review of system was obtained and negative except findings in the HPI    Visit Vitals    /84    Pulse (!) 101    Temp 98.8 °F (37.1 °C) (Oral)    Resp 16    Ht 5' 1\" (1.549 m)    Wt 244 lb 8 oz (110.9 kg)    SpO2 98%    BMI 46.2 kg/m2     Physical Exam   Constitutional: She is oriented to person, place, and time. She appears well-developed and well-nourished. Neck: No JVD present. Cardiovascular: Normal rate, regular rhythm and intact distal pulses. Exam reveals no gallop and no friction rub. No murmur heard. Pulmonary/Chest: Effort normal and breath sounds normal. No respiratory distress. She has no wheezes. Musculoskeletal: She exhibits no edema. Neurological: She is alert and oriented to person, place, and time. Skin: Skin is warm.    Nursing note and vitals reviewed. ASSESSMENT and PLAN  Encounter Diagnoses   Name Primary?  Hypothyroidism, unspecified type Yes     Orders Placed This Encounter    THYROID PANEL W/TSH     Labs updated today  Dev plan with results    I have discussed the diagnosis with the patient and the intended plan as seen in the above orders. The patient has received an after-visit summary and questions were answered concerning future plans. Patient conveyed understanding of the plan at the time of the visit.     Eladia Honeycutt MSN, ANP  6/7/2018

## 2018-06-07 NOTE — PROGRESS NOTES
Hey there, your thyroid is in pretty good range now, I would not change any meds at this time.  Recheck in 6 months, [address] [place] [address]

## 2018-06-13 ENCOUNTER — OFFICE VISIT (OUTPATIENT)
Dept: FAMILY MEDICINE CLINIC | Age: 63
End: 2018-06-13

## 2018-06-13 VITALS
HEART RATE: 82 BPM | OXYGEN SATURATION: 97 % | SYSTOLIC BLOOD PRESSURE: 130 MMHG | TEMPERATURE: 98.2 F | BODY MASS INDEX: 46.49 KG/M2 | WEIGHT: 246.25 LBS | DIASTOLIC BLOOD PRESSURE: 86 MMHG | HEIGHT: 61 IN | RESPIRATION RATE: 16 BRPM
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
## 2018-06-13 RX ORDER — MUPIROCIN 20 MG/G
OINTMENT TOPICAL
Qty: 30 G | Refills: 0 | Status: SHIPPED | OUTPATIENT
Start: 2018-06-13 | End: 2019-01-15 | Stop reason: ALTCHOICE

## 2018-06-13 NOTE — PROGRESS NOTES
1. Have you been to the ER, urgent care clinic since your last visit? Hospitalized since your last visit? No    2. Have you seen or consulted any other health care providers outside of the [address] [place] [address] since your last visit? Include any pap smears or colon screening. No    Chief Complaint   Patient presents with   Liza Shaunna 85     left forearm x 1 wk     Pt states she has an insect bite on left forearm x 1 wk. She states she doesn't feel right.

## 2018-06-13 NOTE — MR AVS SNAPSHOT
[address] [address] 
[phone] Patient: Robby Rainey MRN: [id number] RRV:[id number] Visit Information Date & Time Provider Department Dept. Phone Encounter #  
 6/13/2018  3:15 PM Alma Delia Caballero NP [address] [facility] [phone] [id number] Upcoming Health Maintenance Date Due  
 MEDICARE YEARLY EXAM 7/15/2018 Influenza Age 5 to Adult 8/1/2018 FOBT Q 1 YEAR AGE 50-75 11/17/2018 BREAST CANCER SCRN MAMMOGRAM 10/16/2019 PAP AKA CERVICAL CYTOLOGY 11/21/2020 DTaP/Tdap/Td series (2 - Td) 10/16/2027 Allergies as of 6/13/2018  Review Complete On: 6/13/2018 By: Barry Dean LPN Severity Noted Reaction Type Reactions Bactrim [Sulfamethoprim Ds]  12/30/2013   Systemic Rash Patient gets full bodied rash. Ibuprofen  05/12/2010    Other (comments)  
 headaches Pcn [Penicillins]  05/12/2010    Itching Sulfa (Sulfonamide Antibiotics)  07/25/2015    Hives Current Immunizations  Reviewed on 10/3/2016 No immunizations on file. Not reviewed this visit You Were Diagnosed With   
  
 Codes Comments Bug bite with infection, initial encounter    -  Primary ICD-10-CM: W57Kenzie Marianela Petra ICD-9-CM: 919.5, E906.4 Vitals BP Pulse Temp Resp Height(growth percentile) Weight(growth percentile) 130/86 82 98.2 °F (36.8 °C) (Oral) 16 5' 1\" (1.549 m) 246 lb 4 oz (111.7 kg) SpO2 BMI OB Status Smoking Status 97% 46.53 kg/m2 Postmenopausal Current Every Day Smoker Vitals History BMI and BSA Data Body Mass Index Body Surface Area  
 46.53 kg/m 2 2.19 m 2 Preferred Pharmacy Pharmacy Name Phone [facility] [phone] Your Updated Medication List  
  
   
This list is accurate as of 6/13/18  3:42 PM.  Always use your most recent med list.  
  
  
  
  
 ACEROLA C-500 PO  
 Take  by mouth. furosemide 20 mg tablet Commonly known as:  LASIX TAKE ONE TABLET BY MOUTH ONCE DAILY  
  
 levothyroxine 175 mcg tablet Commonly known as:  SYNTHROID Take 1 Tab by mouth Daily (before breakfast). loratadine 10 mg tablet Commonly known as:  Laura Punches Take 1 Tab by mouth daily. multivitamin tablet Commonly known as:  ONE A DAY Take 1 Tab by mouth daily. mupirocin 2 % ointment Commonly known as:  [employer] Apply  to affected area two (2) times daily as needed. Infected wound Prescriptions Sent to Pharmacy Refills  
 mupirocin (BACTROBAN) 2 % ointment 0 Sig: Apply  to affected area two (2) times daily as needed. Infected wound Class: Normal  
 Pharmacy: [address] [facility] #: [phone] Route: Topical  
  
Introducing \[facility]\"" & HEALTH SERVICES! Dear Shantel Farmer: Thank you for requesting a mySociety account. Our records indicate that you already have an active mySociety account. You can access your account anytime at [email or web address] Did you know that you can access your hospital and ER discharge instructions at any time in mySociety? You can also review all of your test results from your hospital stay or ER visit. Additional Information If you have questions, please visit the Frequently Asked Questions section of the mySociety website at [email or web address] Remember, mySociety is NOT to be used for urgent needs. For medical emergencies, dial 911. Now available from your iPhone and Android! Please provide this summary of care documentation to your next provider. Your primary care clinician is listed as Adela Mckinnon. If you have any questions after today's visit, please call [phone].

## 2018-06-14 NOTE — PROGRESS NOTES
HISTORY OF PRESENT ILLNESS  Millie Fuentes is a 63 y.o. female. HPI  Left forearm with insect bite x 1 week  Concerned it was spider bite  No tracking or redness but tender to the touch and raised  No fever or chills noted    ROS  A comprehensive review of system was obtained and negative except findings in the HPI    Visit Vitals    /86    Pulse 82    Temp 98.2 °F (36.8 °C) (Oral)    Resp 16    Ht 5' 1\" (1.549 m)    Wt 246 lb 4 oz (111.7 kg)    SpO2 97%    BMI 46.53 kg/m2     Physical Exam   Constitutional: She is oriented to person, place, and time. She appears well-developed and well-nourished. Neck: No JVD present. Cardiovascular: Normal rate, regular rhythm and intact distal pulses. Exam reveals no gallop and no friction rub. No murmur heard. Pulmonary/Chest: Effort normal and breath sounds normal. No respiratory distress. She has no wheezes. Musculoskeletal: She exhibits no edema. Neurological: She is alert and oriented to person, place, and time. Skin: Skin is warm. There is erythema. Left forearm with . 3cm size insect bite raised and tenderness but not red or tracking seen   Nursing note and vitals reviewed. ASSESSMENT and PLAN  Encounter Diagnoses   Name Primary?  Bug bite with infection, initial encounter Yes     Orders Placed This Encounter    mupirocin (BACTROBAN) 2 % ointment     Given bactroban oint to use bid x 7 days  Follow up prn  Reviewed wound care    I have discussed the diagnosis with the patient and the intended plan as seen in the above orders. The patient has received an after-visit summary and questions were answered concerning future plans. Patient conveyed understanding of the plan at the time of the visit.     Emily Murphy, MSN, ANP  6/13/2018

## 2018-06-19 ENCOUNTER — OFFICE VISIT (OUTPATIENT)
Dept: FAMILY MEDICINE CLINIC | Age: 63
End: 2018-06-19

## 2018-06-19 VITALS
DIASTOLIC BLOOD PRESSURE: 75 MMHG | RESPIRATION RATE: 18 BRPM | TEMPERATURE: 99.2 F | BODY MASS INDEX: 45.31 KG/M2 | HEIGHT: 61 IN | SYSTOLIC BLOOD PRESSURE: 130 MMHG | HEART RATE: 79 BPM | OXYGEN SATURATION: 96 % | WEIGHT: 240 LBS

## 2018-06-19 DIAGNOSIS — J06.9 ACUTE URI: Primary | ICD-10-CM

## 2018-06-19 DIAGNOSIS — J40 BRONCHITIS: ICD-10-CM

## 2018-06-19 RX ORDER — CLARITHROMYCIN 500 MG/1
500 TABLET, FILM COATED ORAL 2 TIMES DAILY
Qty: 20 TAB | Refills: 0 | Status: SHIPPED | OUTPATIENT
Start: 2018-06-19 | End: 2018-06-29

## 2018-06-19 NOTE — PROGRESS NOTES
Chief Complaint   Patient presents with    Sinus Pain    Nasal Congestion    Cough     Patient in office today for sx that began Friday. Have been treating with Mucinex with slight relief noted. 1. Have you been to the ER, urgent care clinic since your last visit? Hospitalized since your last visit? No    2. Have you seen or consulted any other health care providers outside of the [address] [place] [address] since your last visit? Include any pap smears or colon screening.  No

## 2018-06-19 NOTE — PROGRESS NOTES
Chief Complaint   Patient presents with    Sinus Pain    Nasal Congestion    Cough     Patient in office today for sx that began Friday. Have been treating with Mucinex with slight relief noted. Sx started on Friday with sneezing and upper respiratory congestion. Sinus pressure. PND. Has since moved to the chest. Cough is productive at times. Neon yellow congestion. Low grade fever today. Denies any recent abx. Denies any sick contacts. Denies any wheezing, sob, and dyspnea. Currently living with some friends who are on vacation this week. Denies any other concerns at this time. Chief Complaint   Patient presents with    Sinus Pain    Nasal Congestion    Cough     she is a 63y.o. year old female who presents for evalution. Reviewed PmHx, RxHx, FmHx, SocHx, AllgHx and updated and dated in the chart. Review of Systems - negative except as listed above in the HPI    Objective:     Vitals:    06/19/18 1506   BP: 130/75   Pulse: 79   Resp: 18   Temp: 99.2 °F (37.3 °C)   TempSrc: Oral   SpO2: 96%   Weight: 240 lb (108.9 kg)   Height: 5' 1\" (1.549 m)     Physical Examination: General appearance - alert, well appearing, and in no distress  Mental status - normal mood, behavior  Eyes - pupils equal and reactive, extraocular eye movements intact  Ears - bilateral TM's and external ear canals normal  Nose - mucosal congestion, mucosal erythema, purulent rhinorrhea and sinus tenderness noted   Mouth - mucous membranes moist, pharynx normal without lesions  Neck - supple, no significant adenopathy  Chest - clear to auscultation, no wheezes, rales or rhonchi, symmetric air entry, productive sounding cough   Heart - normal rate, regular rhythm, normal S1, S2, no murmurs    Assessment/ Plan:   Diagnoses and all orders for this visit:    1. Acute URI / 2. Bronchitis  -     clarithromycin (BIAXIN) 500 mg tablet; Take 1 Tab by mouth two (2) times a day for 10 days.   Start and complete full course of biaxin. Dwp ADRs/SEs of medication. Push fluids. Rest. Saline nasal spray for nasal congestion. OTC motrin/apap for fevers. RTC if sx persist or worsen. Follow-up Disposition:  Return if symptoms worsen or fail to improve. I have discussed the diagnosis with the patient and the intended plan as seen in the above orders. The patient has received an after-visit summary and questions were answered concerning future plans. Medication Side Effects and Warnings were discussed with patient: yes  Patient Labs were reviewed and or requested: yes  Patient Past Records were reviewed and or requested  yes  Patient / Caregiver Understanding of treatment plan was verbalized during office visit YES    CONSTANCE Mcginnis-C    There are no Patient Instructions on file for this visit.

## 2018-06-19 NOTE — MR AVS SNAPSHOT
[address] [address] 
[phone] Patient: Joel De La Torre MRN: [id number]:[id number] Visit Information Date & Time Provider Department Dept. Phone Encounter #  
 6/19/2018  3:00 PM Nickie Lehman NP [address] [facility] [phone] [id number] Follow-up Instructions Return if symptoms worsen or fail to improve. Upcoming Health Maintenance Date Due  
 MEDICARE YEARLY EXAM 7/15/2018 Influenza Age 5 to Adult 8/1/2018 FOBT Q 1 YEAR AGE 50-75 11/17/2018 BREAST CANCER SCRN MAMMOGRAM 10/16/2019 PAP AKA CERVICAL CYTOLOGY 11/21/2020 DTaP/Tdap/Td series (2 - Td) 10/16/2027 Allergies as of 6/19/2018  Review Complete On: 6/19/2018 By: Nickie Lehman NP Severity Noted Reaction Type Reactions Bactrim [Sulfamethoprim Ds]  12/30/2013   Systemic Rash Patient gets full bodied rash. Ibuprofen  05/12/2010    Other (comments)  
 headaches Pcn [Penicillins]  05/12/2010    Itching Sulfa (Sulfonamide Antibiotics)  07/25/2015    Hives Current Immunizations  Reviewed on 10/3/2016 No immunizations on file. Not reviewed this visit You Were Diagnosed With   
  
 Codes Comments Acute URI    -  Primary ICD-10-CM: J06.9 ICD-9-CM: 465.9 Bronchitis     ICD-10-CM: J40 ICD-9-CM: 008 Vitals BP Pulse Temp Resp Height(growth percentile) Weight(growth percentile) 130/75 (BP 1 Location: Right arm, BP Patient Position: Sitting) 79 99.2 °F (37.3 °C) (Oral) 18 5' 1\" (1.549 m) 240 lb (108.9 kg) SpO2 BMI OB Status Smoking Status 96% 45.35 kg/m2 Postmenopausal Current Every Day Smoker Vitals History BMI and BSA Data Body Mass Index Body Surface Area  
 45.35 kg/m 2 2.16 m 2 [facility] [address] [place] [phone] Your Updated Medication List  
  
   
 This list is accurate as of 6/19/18  3:24 PM.  Always use your most recent med list.  
  
  
  
  
 ACEROLA C-500 PO Take  by mouth. clarithromycin 500 mg tablet Commonly known as:  Jasper President Take 1 Tab by mouth two (2) times a day for 10 days. furosemide 20 mg tablet Commonly known as:  LASIX TAKE ONE TABLET BY MOUTH ONCE DAILY  
  
 levothyroxine 175 mcg tablet Commonly known as:  SYNTHROID Take 1 Tab by mouth Daily (before breakfast). loratadine 10 mg tablet Commonly known as:  Curlee Arms Take 1 Tab by mouth daily. multivitamin tablet Commonly known as:  ONE A DAY Take 1 Tab by mouth daily. mupirocin 2 % ointment Commonly known as:  [employer] Apply  to affected area two (2) times daily as needed. Infected wound Prescriptions Sent to Pharmacy Refills  
 clarithromycin (BIAXIN) 500 mg tablet 0 Sig: Take 1 Tab by mouth two (2) times a day for 10 days. Class: Normal  
 Pharmacy: [facility] [address] [facility], [address] [place] [address] #: [phone] Route: Oral  
  
Follow-up Instructions Return if symptoms worsen or fail to improve. Introducing [facility] & HEALTH SERVICES! Dear Shannan Bhatti: Thank you for requesting a MinuteKey account. Our records indicate that you already have an active MinuteKey account. You can access your account anytime at [email or web address] Did you know that you can access your hospital and ER discharge instructions at any time in MinuteKey? You can also review all of your test results from your hospital stay or ER visit. Additional Information If you have questions, please visit the Frequently Asked Questions section of the MinuteKey website at [email or web address] Remember, MinuteKey is NOT to be used for urgent needs. For medical emergencies, dial 911. Now available from your iPhone and Android! Please provide this summary of care documentation to your next provider. Your primary care clinician is listed as Valeria Bone. If you have any questions after today's visit, please call [phone].

## 2018-06-25 ENCOUNTER — OFFICE VISIT (OUTPATIENT)
Dept: FAMILY MEDICINE CLINIC | Age: 63
End: 2018-06-25

## 2018-06-25 VITALS
RESPIRATION RATE: 20 BRPM | DIASTOLIC BLOOD PRESSURE: 85 MMHG | HEART RATE: 73 BPM | BODY MASS INDEX: 47.32 KG/M2 | TEMPERATURE: 98.2 F | OXYGEN SATURATION: 98 % | WEIGHT: 241 LBS | SYSTOLIC BLOOD PRESSURE: 146 MMHG | HEIGHT: 60 IN

## 2018-06-25 DIAGNOSIS — R11.0 NAUSEA: Primary | ICD-10-CM

## 2018-06-25 RX ORDER — ONDANSETRON 4 MG/1
4 TABLET, ORALLY DISINTEGRATING ORAL
Qty: 20 TAB | Refills: 1 | Status: SHIPPED | OUTPATIENT
Start: 2018-06-25 | End: 2019-01-15 | Stop reason: ALTCHOICE

## 2018-06-25 RX ORDER — RANITIDINE 150 MG/1
150 TABLET, FILM COATED ORAL 2 TIMES DAILY
Qty: 60 TAB | Refills: 1 | Status: SHIPPED | OUTPATIENT
Start: 2018-06-25

## 2018-06-25 NOTE — PATIENT INSTRUCTIONS
Body Mass Index: Care Instructions  Your Care Instructions    Body mass index (BMI) can help you see if your weight is raising your risk for health problems. It uses a formula to compare how much you weigh with how tall you are. · A BMI lower than 18.5 is considered underweight. · A BMI between 18.5 and 24.9 is considered healthy. · A BMI between 25 and 29.9 is considered overweight. A BMI of 30 or higher is considered obese. If your BMI is in the normal range, it means that you have a lower risk for weight-related health problems. If your BMI is in the overweight or obese range, you may be at increased risk for weight-related health problems, such as high blood pressure, heart disease, stroke, arthritis or joint pain, and diabetes. If your BMI is in the underweight range, you may be at increased risk for health problems such as fatigue, lower protection (immunity) against illness, muscle loss, bone loss, hair loss, and hormone problems. BMI is just one measure of your risk for weight-related health problems. You may be at higher risk for health problems if you are not active, you eat an unhealthy diet, or you drink too much alcohol or use tobacco products. Follow-up care is a key part of your treatment and safety. Be sure to make and go to all appointments, and call your doctor if you are having problems. It's also a good idea to know your test results and keep a list of the medicines you take. How can you care for yourself at home? · Practice healthy eating habits. This includes eating plenty of fruits, vegetables, whole grains, lean protein, and low-fat dairy. · If your doctor recommends it, get more exercise. Walking is a good choice. Bit by bit, increase the amount you walk every day. Try for at least 30 minutes on most days of the week. · Do not smoke. Smoking can increase your risk for health problems. If you need help quitting, talk to your doctor about stop-smoking programs and medicines. These can increase your chances of quitting for good. · Limit alcohol to 2 drinks a day for men and 1 drink a day for women. Too much alcohol can cause health problems. If you have a BMI higher than 25  · Your doctor may do other tests to check your risk for weight-related health problems. This may include measuring the distance around your waist. A waist measurement of more than 40 inches in men or 35 inches in women can increase the risk of weight-related health problems. · Talk with your doctor about steps you can take to stay healthy or improve your health. You may need to make lifestyle changes to lose weight and stay healthy, such as changing your diet and getting regular exercise. If you have a BMI lower than 18.5  · Your doctor may do other tests to check your risk for health problems. · Talk with your doctor about steps you can take to stay healthy or improve your health. You may need to make lifestyle changes to gain or maintain weight and stay healthy, such as getting more healthy foods in your diet and doing exercises to build muscle. Where can you learn more? Go to http://nathan-da.info/. Enter S176 in the search box to learn more about \"Body Mass Index: Care Instructions. \"  Current as of: October 13, 2016  Content Version: 11.4  © 5874-0430 [employer]. Care instructions adapted under license by [employer] (which disclaims liability or warranty for this information). If you have questions about a medical condition or this instruction, always ask your healthcare professional. [address] [address] any warranty or liability for your use of this information. Nausea and Vomiting: Care Instructions  Your Care Instructions    When you are nauseated, you may feel weak and sweaty and notice a lot of saliva in your mouth. Nausea often leads to vomiting.  Most of the time you do not need to worry about nausea and vomiting, but they can be signs of other illnesses. Two common causes of nausea and vomiting are stomach flu and food poisoning. Nausea and vomiting from viral stomach flu will usually start to improve within 24 hours. Nausea and vomiting from food poisoning may last from 12 to 48 hours. The doctor has checked you carefully, but problems can develop later. If you notice any problems or new symptoms, get medical treatment right away. Follow-up care is a key part of your treatment and safety. Be sure to make and go to all appointments, and call your doctor if you are having problems. It's also a good idea to know your test results and keep a list of the medicines you take. How can you care for yourself at home? · To prevent dehydration, drink plenty of fluids, enough so that your urine is light yellow or clear like water. Choose water and other caffeine-free clear liquids until you feel better. If you have kidney, heart, or liver disease and have to limit fluids, talk with your doctor before you increase the amount of fluids you drink. · Rest in bed until you feel better. · When you are able to eat, try clear soups, mild foods, and liquids until all symptoms are gone for 12 to 48 hours. Other good choices include dry toast, crackers, cooked cereal, and gelatin dessert, such as Jell-O. When should you call for help? Call 911 anytime you think you may need emergency care. For example, call if:  ? · You passed out (lost consciousness). ?Call your doctor now or seek immediate medical care if:  ? · You have symptoms of dehydration, such as:  ¨ Dry eyes and a dry mouth. ¨ Passing only a little dark urine. ¨ Feeling thirstier than usual.   ? · You have new or worsening belly pain. ? · You have a new or higher fever. ? · You vomit blood or what looks like coffee grounds. ? Watch closely for changes in your health, and be sure to contact your doctor if:  ? · You have ongoing nausea and vomiting. ? · Your vomiting is getting worse. ? · Your vomiting lasts longer than 2 days. ? · You are not getting better as expected. Where can you learn more? Go to http://nathan-da.info/. Enter 25 [id number] in the search box to learn more about \"Nausea and Vomiting: Care Instructions. \"  Current as of: March 20, 2017  Content Version: 11.4  © 8610-9445 [employer]. Care instructions adapted under license by [employer] (which disclaims liability or warranty for this information). If you have questions about a medical condition or this instruction, always ask your healthcare professional. [address] [place] [address] any warranty or liability for your use of this information.

## 2018-06-25 NOTE — PROGRESS NOTES
Chief Complaint   Patient presents with    Cough     unresolved    Diarrhea     Pt seen in the office today for unresolved coughing and diarrhea. She still has 5 doses remaining of previously prescribed abx, biaxin. Pt reports she is unsure if the loose stool started before the abt. Has been using OTC with some relief  Continues to be able to tolerate liquids only,      Subjective: (As above and below)     Chief Complaint   Patient presents with    Cough     unresolved    Diarrhea     she is a 63y.o. year old female who presents for evaluation. Reviewed PmHx, RxHx, FmHx, SocHx, AllgHx and updated in chart. Review of Systems - negative except as listed above    Objective:     Vitals:    06/25/18 0851   BP: 146/85   Pulse: 73   Resp: 20   Temp: 98.2 °F (36.8 °C)   TempSrc: Oral   SpO2: 98%   Weight: 241 lb (109.3 kg)   Height: 5' (1.524 m)     Physical Examination: General appearance - alert, well appearing, and in no distress  Mental status - normal mood, behavior, speech, dress, motor activity, and thought processes  Mouth - mucous membranes moist, pharynx normal without lesions  Chest - clear to auscultation, no wheezes, rales or rhonchi, symmetric air entry  Heart - normal rate, regular rhythm, normal S1, S2, no murmurs, rubs, clicks or gallops  Abdomen - soft, nontender, nondistended, no masses or organomegaly    Assessment/ Plan:   1. Nausea  -primary problem is nausea with medication  -finish abx as written  - ondansetron (ZOFRAN ODT) 4 mg disintegrating tablet; Take 1 Tab by mouth every eight (8) hours as needed for Nausea. Dispense: 20 Tab; Refill: 1  - raNITIdine (ZANTAC) 150 mg tablet; Take 1 Tab by mouth two (2) times a day. Dispense: 60 Tab; Refill: 1     Follow-up Disposition: As needed  I have discussed the diagnosis with the patient and the intended plan as seen in the above orders. The patient has received an after-visit summary and questions were answered concerning future plans. Medication Side Effects and Warnings were discussed with patient: yes  Patient Labs were reviewed: yes  Patient Past Records were reviewed:  yes    Lidia Reyez M.D. Discussed the patient's BMI with her. The BMI follow up plan is as follows:     dietary management education, guidance, and counseling  encourage exercise  monitor weight  prescribed dietary intake    An After Visit Summary was printed and given to the patient.

## 2018-06-25 NOTE — MR AVS SNAPSHOT
[address] [address] 
[phone] Patient: Melanie France MRN: [id number] ANUJA:[id number] Visit Information Date & Time Provider Department Dept. Phone Encounter #  
 6/25/2018  8:30 AM Charlette Alejandro MD [address] [facility] [phone] [id number] Upcoming Health Maintenance Date Due  
 MEDICARE YEARLY EXAM 7/15/2018 Influenza Age 5 to Adult 8/1/2018 FOBT Q 1 YEAR AGE 50-75 11/17/2018 BREAST CANCER SCRN MAMMOGRAM 10/16/2019 PAP AKA CERVICAL CYTOLOGY 11/21/2020 DTaP/Tdap/Td series (2 - Td) 10/16/2027 Allergies as of 6/25/2018  Review Complete On: 6/25/2018 By: Charlette Alejandro MD  
  
 Severity Noted Reaction Type Reactions Bactrim [Sulfamethoprim Ds]  12/30/2013   Systemic Rash Patient gets full bodied rash. Ibuprofen  05/12/2010    Other (comments)  
 headaches Pcn [Penicillins]  05/12/2010    Itching Sulfa (Sulfonamide Antibiotics)  07/25/2015    Hives Current Immunizations  Reviewed on 10/3/2016 No immunizations on file. Not reviewed this visit You Were Diagnosed With   
  
 Codes Comments Nausea    -  Primary ICD-10-CM: R11.0 ICD-9-CM: 787.02 Vitals BP Pulse Temp Resp Height(growth percentile) Weight(growth percentile) 146/85 (BP 1 Location: Left arm, BP Patient Position: Sitting) 73 98.2 °F (36.8 °C) (Oral) 20 5' (1.524 m) 241 lb (109.3 kg) SpO2 BMI OB Status Smoking Status 98% 47.07 kg/m2 Postmenopausal Current Every Day Smoker Vitals History BMI and BSA Data Body Mass Index Body Surface Area 47.07 kg/m 2 2.15 m 2 Preferred Pharmacy Pharmacy Name Phone [address] [phone] Your Updated Medication List  
  
   
This list is accurate as of 6/25/18  9:19 AM.  Always use your most recent med list.  
  
  
  
  
 ACEROLA C-500 PO  
 Take  by mouth. clarithromycin 500 mg tablet Commonly known as:  Derotha Pester Take 1 Tab by mouth two (2) times a day for 10 days. furosemide 20 mg tablet Commonly known as:  LASIX TAKE ONE TABLET BY MOUTH ONCE DAILY  
  
 levothyroxine 175 mcg tablet Commonly known as:  SYNTHROID Take 1 Tab by mouth Daily (before breakfast). loratadine 10 mg tablet Commonly known as:  Shey Schlossman Take 1 Tab by mouth daily. multivitamin tablet Commonly known as:  ONE A DAY Take 1 Tab by mouth daily. mupirocin 2 % ointment Commonly known as:  [facility] Apply  to affected area two (2) times daily as needed. Infected wound  
  
 ondansetron 4 mg disintegrating tablet Commonly known as:  ZOFRAN ODT Take 1 Tab by mouth every eight (8) hours as needed for Nausea. raNITIdine 150 mg tablet Commonly known as:  ZANTAC Take 1 Tab by mouth two (2) times a day. Prescriptions Sent to Pharmacy Refills  
 ondansetron (ZOFRAN ODT) 4 mg disintegrating tablet 1 Sig: Take 1 Tab by mouth every eight (8) hours as needed for Nausea. Class: Normal  
 Pharmacy: [facility] [address], [address] [place] [address] Ph #: [phone] Route: Oral  
 raNITIdine (ZANTAC) 150 mg tablet 1 Sig: Take 1 Tab by mouth two (2) times a day. Class: Normal  
 Pharmacy: [facility] [address], [address] [place] [address] Ph #: [phone] Route: Oral  
  
Patient Instructions Body Mass Index: Care Instructions Your Care Instructions Body mass index (BMI) can help you see if your weight is raising your risk for health problems. It uses a formula to compare how much you weigh with how tall you are. · A BMI lower than 18.5 is considered underweight. · A BMI between 18.5 and 24.9 is considered healthy. · A BMI between 25 and 29.9 is considered overweight. A BMI of 30 or higher is considered obese. If your BMI is in the normal range, it means that you have a lower risk for weight-related health problems. If your BMI is in the overweight or obese range, you may be at increased risk for weight-related health problems, such as high blood pressure, heart disease, stroke, arthritis or joint pain, and diabetes. If your BMI is in the underweight range, you may be at increased risk for health problems such as fatigue, lower protection (immunity) against illness, muscle loss, bone loss, hair loss, and hormone problems. BMI is just one measure of your risk for weight-related health problems. You may be at higher risk for health problems if you are not active, you eat an unhealthy diet, or you drink too much alcohol or use tobacco products. Follow-up care is a key part of your treatment and safety. Be sure to make and go to all appointments, and call your doctor if you are having problems. It's also a good idea to know your test results and keep a list of the medicines you take. How can you care for yourself at home? · Practice healthy eating habits. This includes eating plenty of fruits, vegetables, whole grains, lean protein, and low-fat dairy. · If your doctor recommends it, get more exercise. Walking is a good choice. Bit by bit, increase the amount you walk every day. Try for at least 30 minutes on most days of the week. · Do not smoke. Smoking can increase your risk for health problems. If you need help quitting, talk to your doctor about stop-smoking programs and medicines. These can increase your chances of quitting for good. · Limit alcohol to 2 drinks a day for men and 1 drink a day for women. Too much alcohol can cause health problems. If you have a BMI higher than 25 · Your doctor may do other tests to check your risk for weight-related health problems.  This may include measuring the distance around your waist. A waist measurement of more than 40 inches in men or 35 inches in women can increase the risk of weight-related health problems. · Talk with your doctor about steps you can take to stay healthy or improve your health. You may need to make lifestyle changes to lose weight and stay healthy, such as changing your diet and getting regular exercise. If you have a BMI lower than 18.5 · Your doctor may do other tests to check your risk for health problems. · Talk with your doctor about steps you can take to stay healthy or improve your health. You may need to make lifestyle changes to gain or maintain weight and stay healthy, such as getting more healthy foods in your diet and doing exercises to build muscle. Where can you learn more? Go to http://nathan-da.info/. Enter S176 in the search box to learn more about \"Body Mass Index: Care Instructions. \" Current as of: October 13, 2016 Content Version: 11.4 © 3157-0365 [employer]. Care instructions adapted under license by [employer] (which disclaims liability or warranty for this information). If you have questions about a medical condition or this instruction, always ask your healthcare professional. [address] any warranty or liability for your use of this information. Nausea and Vomiting: Care Instructions Your Care Instructions When you are nauseated, you may feel weak and sweaty and notice a lot of saliva in your mouth. Nausea often leads to vomiting. Most of the time you do not need to worry about nausea and vomiting, but they can be signs of other illnesses. Two common causes of nausea and vomiting are stomach flu and food poisoning. Nausea and vomiting from viral stomach flu will usually start to improve within 24 hours. Nausea and vomiting from food poisoning may last from 12 to 48 hours. The doctor has checked you carefully, but problems can develop later. If you notice any problems or new symptoms, get medical treatment right away. Follow-up care is a key part of your treatment and safety. Be sure to make and go to all appointments, and call your doctor if you are having problems. It's also a good idea to know your test results and keep a list of the medicines you take. How can you care for yourself at home? · To prevent dehydration, drink plenty of fluids, enough so that your urine is light yellow or clear like water. Choose water and other caffeine-free clear liquids until you feel better. If you have kidney, heart, or liver disease and have to limit fluids, talk with your doctor before you increase the amount of fluids you drink. · Rest in bed until you feel better. · When you are able to eat, try clear soups, mild foods, and liquids until all symptoms are gone for 12 to 48 hours. Other good choices include dry toast, crackers, cooked cereal, and gelatin dessert, such as Jell-O. When should you call for help? Call 911 anytime you think you may need emergency care. For example, call if: 
? · You passed out (lost consciousness). ?Call your doctor now or seek immediate medical care if: 
? · You have symptoms of dehydration, such as: ¨ Dry eyes and a dry mouth. ¨ Passing only a little dark urine. ¨ Feeling thirstier than usual.  
? · You have new or worsening belly pain. ? · You have a new or higher fever. ? · You vomit blood or what looks like coffee grounds. ? Watch closely for changes in your health, and be sure to contact your doctor if: 
? · You have ongoing nausea and vomiting. ? · Your vomiting is getting worse. ? · Your vomiting lasts longer than 2 days. ? · You are not getting better as expected. Where can you learn more? Go to http://nathan-da.info/. Enter 25 [id number] in the search box to learn more about \"Nausea and Vomiting: Care Instructions. \" Current as of: March 20, 2017 Content Version: 11.4 © 2790-7495 [employer].  Care instructions adapted under license by [address] (which disclaims liability or warranty for this information). If you have questions about a medical condition or this instruction, always ask your healthcare professional. [address] any warranty or liability for your use of this information. Patient Instructions History Introducing [facility]! Dear Kleber Files: Thank you for requesting a Jobool account. Our records indicate that you already have an active Jobool account. You can access your account anytime at [email or web address]. Contour/Design[employer] Pages Online Did you know that you can access your hospital and ER discharge instructions at any time in Jobool? You can also review all of your test results from your hospital stay or ER visit. Additional Information If you have questions, please visit the Frequently Asked Questions section of the Jobool website at [email or web address]/. Remember, Jobool is NOT to be used for urgent needs. For medical emergencies, dial 911. Now available from your iPhone and Android! Please provide this summary of care documentation to your next provider. Your primary care clinician is listed as Glen Valadez. If you have any questions after today's visit, please call [phone].

## 2018-07-15 DIAGNOSIS — E03.9 HYPOTHYROIDISM, UNSPECIFIED TYPE: ICD-10-CM

## 2018-07-15 RX ORDER — LEVOTHYROXINE SODIUM 175 UG/1
175 TABLET ORAL
Qty: 30 TAB | Refills: 2 | Status: SHIPPED | OUTPATIENT
Start: 2018-07-15 | End: 2018-12-18 | Stop reason: SDUPTHER

## 2018-07-15 NOTE — TELEPHONE ENCOUNTER
From: Ger Tinoco  To: Juliane Ott NP  Sent: 7/15/2018 12:46 PM EDT  Subject: Medication Renewal Request    Original authorizing provider: DI Bello would like a refill of the following medications:  levothyroxine (SYNTHROID) 175 mcg tablet Juliane Ott NP]    Preferred pharmacy: [address] [place] [address]:  Levothyroxin not sure if it is supposed to be 175mcg or 150mcg. Either way I am requesting a refill as I have ran out.  Thank you, Christy Sanchez!! &#[phone];

## 2018-07-19 ENCOUNTER — TELEPHONE (OUTPATIENT)
Dept: FAMILY MEDICINE CLINIC | Age: 63
End: 2018-07-19

## 2018-07-19 NOTE — TELEPHONE ENCOUNTER
----- Message from Andrés Culver sent at 7/19/2018 10:25 AM EDT -----  Regarding: DI Curran/Phone  Patient is requesting call back to [phone]. She has moved and will now be using [facility] as her pharmacy. Also, she just got a refill of levothyroxine and it was 175 mcg. She thinks that Sana lowered it to 150 mcg. Can you let her know for sure?

## 2018-12-18 DIAGNOSIS — E03.9 HYPOTHYROIDISM, UNSPECIFIED TYPE: ICD-10-CM

## 2018-12-18 RX ORDER — LEVOTHYROXINE SODIUM 175 UG/1
175 TABLET ORAL
Qty: 30 TAB | Refills: 2 | Status: SHIPPED | OUTPATIENT
Start: 2018-12-18 | End: 2019-04-06 | Stop reason: SDUPTHER

## 2018-12-18 RX ORDER — FUROSEMIDE 20 MG/1
TABLET ORAL
Qty: 30 TAB | Refills: 5 | Status: SHIPPED | OUTPATIENT
Start: 2018-12-18

## 2019-01-15 ENCOUNTER — OFFICE VISIT (OUTPATIENT)
Dept: FAMILY MEDICINE CLINIC | Age: 64
End: 2019-01-15

## 2019-01-15 VITALS
RESPIRATION RATE: 18 BRPM | TEMPERATURE: 98.7 F | DIASTOLIC BLOOD PRESSURE: 84 MMHG | SYSTOLIC BLOOD PRESSURE: 131 MMHG | OXYGEN SATURATION: 95 % | BODY MASS INDEX: 45.78 KG/M2 | HEIGHT: 60 IN | WEIGHT: 233.2 LBS | HEART RATE: 78 BPM

## 2019-01-15 DIAGNOSIS — E78.00 HYPERCHOLESTEREMIA: ICD-10-CM

## 2019-01-15 DIAGNOSIS — E03.9 HYPOTHYROIDISM, UNSPECIFIED TYPE: ICD-10-CM

## 2019-01-15 DIAGNOSIS — Z00.00 WELL ADULT EXAM: Primary | ICD-10-CM

## 2019-01-15 PROBLEM — E66.01 OBESITY, MORBID (HCC): Status: ACTIVE | Noted: 2019-01-15

## 2019-01-15 NOTE — PROGRESS NOTES
This is the Subsequent Medicare Annual Wellness Exam, performed 12 months or more after the Initial AWV or the last Subsequent AWV I have reviewed the patient's medical history in detail and updated the computerized patient record. History Past Medical History:  
Diagnosis Date  Chronic low back pain 3/11/2013  Hypothyroid  Spondylisthesis  Thyroid disease  Tobacco abuse 11/3/2011 Past Surgical History:  
Procedure Laterality Date  DELIVERY     
 HX [address]CTOMY [address] Current Outpatient Medications Medication Sig Dispense Refill  furosemide (LASIX) 20 mg tablet TAKE ONE TABLET BY MOUTH ONCE DAILY 30 Tab 5  levothyroxine (SYNTHROID) 175 mcg tablet Take 1 Tab by mouth Daily (before breakfast). 30 Tab 2  
 raNITIdine (ZANTAC) 150 mg tablet Take 1 Tab by mouth two (2) times a day. (Patient taking differently: Take 150 mg by mouth as needed.) 60 Tab 1 Allergies Allergen Reactions  Bactrim [Sulfamethoprim Ds] Rash Patient gets full bodied rash.  Ibuprofen Other (comments)  
  headaches  Pcn [Penicillins] Itching  Sulfa (Sulfonamide Antibiotics) Hives Family History Problem Relation Age of Onset  Diabetes Maternal Grandmother Social History Tobacco Use  Smoking status: Current Every Day Smoker Packs/day: 1.00 Years: 40.00 Pack years: 40.00  Smokeless tobacco: Never Used  Tobacco comment: tried snorting snuff to clear sinuses Substance Use Topics  Alcohol use: No  
 
Patient Active Problem List  
Diagnosis Code  Hypothyroid E03.9  Tobacco abuse Z72.0  Hypercholesteremia E78.00  Chronic low back pain M54.5, G89.29  
 Non morbid obesity due to excess calories E66.09  
 ACP (advance care planning) Z71.89  
 Class 3 obesity due to excess calories without serious comorbidity with body mass index (BMI) of 45.0 to 49.9 in adult [id number]  Obesity, morbid ([facility] [address] E66.01 Depression Risk Factor Screening: PHQ over the last two weeks 1/15/2019 PHQ Not Done - Little interest or pleasure in doing things Not at all Feeling down, depressed, irritable, or hopeless Not at all Total Score PHQ 2 0 Alcohol Risk Factor Screening: You do not drink alcohol or very rarely. Functional Ability and Level of Safety:  
Hearing Loss Hearing is good. Activities of Daily Living The home contains: no safety equipment. Patient does total self care Fall Risk No flowsheet data found. Abuse Screen Patient is not abused Cognitive Screening Evaluation of Cognitive Function: 
Has your family/caregiver stated any concerns about your memory: no 
Normal 
 
Patient Care Team  
Patient Care Team: 
Elvin Wilson NP as PCP - [facility]) Visit Vitals /84 (BP 1 Location: Left arm, BP Patient Position: Sitting) Pulse 78 Temp 98.7 °F (37.1 °C) (Oral) Resp 18 Ht 5' (1.524 m) Wt 233 lb 3.2 oz (105.8 kg) SpO2 95% BMI 45.54 kg/m² Physical Examination:  
GENERAL ASSESSMENT: well developed and well nourished CHEST: normal air exchange, no rales, no rhonchi, no wheezes, respiratory effort normal with no retractions HEART: regular rate and rhythm, normal S1/S2, no murmurs Assessment/Plan Education and counseling provided: 
Are appropriate based on today's review and evaluation Diagnoses and all orders for this visit: 
 
1. Well adult exam 
-     LIPID PANEL 
-     METABOLIC PANEL, COMPREHENSIVE 
-     CBC WITH AUTOMATED DIFF 
-     TSH 3RD GENERATION 2. Hypothyroidism, unspecified type 
-     TSH 3RD GENERATION 3. Hypercholesteremia -     LIPID PANEL I have discussed the diagnosis with the patient and the intended plan as seen in the above orders. The patient has received an after-visit summary and questions were answered concerning future plans.  Patient conveyed understanding of the plan at the time of the visit. Alma Delia Caballero, MSN, ANP  1/15/2019

## 2019-01-15 NOTE — PROGRESS NOTES
David Pierce is a 66 y.o. female Chief Complaint Patient presents with  Follow-up  Labs Thyroid 1. Have you been to the ER, urgent care clinic since your last visit? Hospitalized since your last visit? No 
 
2. Have you seen or consulted any other health care providers outside of the [address] [place] [address] since your last visit? Include any pap smears or colon screening.  No

## 2019-01-15 NOTE — PATIENT INSTRUCTIONS
Medicare Wellness Visit, Female The best way to live healthy is to have a lifestyle where you eat a well-balanced diet, exercise regularly, limit alcohol use, and quit all forms of tobacco/nicotine, if applicable. Regular preventive services are another way to keep healthy. Preventive services (vaccines, screening tests, monitoring & exams) can help personalize your care plan, which helps you manage your own care. Screening tests can find health problems at the earliest stages, when they are easiest to treat. Marcelo Nguy[facility]n follows the current, evidence-based guidelines published by the [facility] ([facility]) when recommending preventive services for our patients. Because we follow these guidelines, sometimes recommendations change over time as research supports it. (For example, mammograms used to be recommended annually. Even though Medicare will still pay for an annual mammogram, the newer guidelines recommend a mammogram every two years for women of average risk.) Of course, you and your doctor may decide to screen more often for some diseases, based on your risk and your health status. Preventive services for you include: - Medicare offers their members a free annual wellness visit, which is time for you and your primary care provider to discuss and plan for your preventive service needs. Take advantage of this benefit every year! 
-All adults over the age of 72 should receive the recommended pneumonia vaccines. Current USPSTF guidelines recommend a series of two vaccines for the best pneumonia protection.  
-All adults should have a flu vaccine yearly and a tetanus vaccine every 10 years. All adults age 61 and older should receive a shingles vaccine once in their lifetime.   
-A bone mass density test is recommended when a woman turns 65 to screen for osteoporosis. This test is only recommended one time, as a screening. Some providers will use this same test as a disease monitoring tool if you already have osteoporosis. -All adults age 38-68 who are overweight should have a diabetes screening test once every three years.  
-Other screening tests and preventive services for persons with diabetes include: an eye exam to screen for diabetic retinopathy, a kidney function test, a foot exam, and stricter control over your cholesterol.  
-Cardiovascular screening for adults with routine risk involves an electrocardiogram (ECG) at intervals determined by your doctor.  
-Colorectal cancer screenings should be done for adults age 54-65 with no increased risk factors for colorectal cancer. There are a number of acceptable methods of screening for this type of cancer. Each test has its own benefits and drawbacks. Discuss with your doctor what is most appropriate for you during your annual wellness visit. The different tests include: colonoscopy (considered the best screening method), a fecal occult blood test, a fecal DNA test, and sigmoidoscopy. -Breast cancer screenings are recommended every other year for women of normal risk, age 54-69. 
-Cervical cancer screenings for women over age 72 are only recommended with certain risk factors.  
-All adults born between [facility] should be screened once for Hepatitis C. Here is a list of your current Health Maintenance items (your personalized list of preventive services) with a due date: 
Health Maintenance Due Topic Date Due  Shingles Vaccine (1 of 2) 11/09/2007  Flu Vaccine  08/01/2018 Juan Manuel Annual Well Visit  09/14/2018  Stool testing for trace blood  11/17/2018

## 2019-01-16 LAB
ALBUMIN SERPL-MCNC: 4.4 G/DL (ref 3.6–4.8)
ALBUMIN/GLOB SERPL: 1.8 {RATIO} (ref 1.2–2.2)
ALP SERPL-CCNC: 102 IU/L (ref 39–117)
ALT SERPL-CCNC: 16 IU/L (ref 0–32)
AST SERPL-CCNC: 17 IU/L (ref 0–40)
BASOPHILS # BLD AUTO: 0.1 X10E3/UL (ref 0–0.2)
BASOPHILS NFR BLD AUTO: 1 %
BILIRUB SERPL-MCNC: 0.3 MG/DL (ref 0–1.2)
BUN SERPL-MCNC: 14 MG/DL (ref 8–27)
BUN/CREAT SERPL: 19 (ref 12–28)
CALCIUM SERPL-MCNC: 9.9 MG/DL (ref 8.7–10.3)
CHLORIDE SERPL-SCNC: 100 MMOL/L (ref 96–106)
CHOLEST SERPL-MCNC: 200 MG/DL (ref 100–199)
CO2 SERPL-SCNC: 24 MMOL/L (ref 20–29)
CREAT SERPL-MCNC: 0.72 MG/DL (ref 0.57–1)
EOSINOPHIL # BLD AUTO: 0.3 X10E3/UL (ref 0–0.4)
EOSINOPHIL NFR BLD AUTO: 3 %
ERYTHROCYTE [DISTWIDTH] IN BLOOD BY AUTOMATED COUNT: 15.2 % (ref 12.3–15.4)
GLOBULIN SER CALC-MCNC: 2.5 G/DL (ref 1.5–4.5)
GLUCOSE SERPL-MCNC: 77 MG/DL (ref 65–99)
HCT VFR BLD AUTO: 42.9 % (ref 34–46.6)
HDLC SERPL-MCNC: 46 MG/DL
HGB BLD-MCNC: 14.2 G/DL (ref 11.1–15.9)
IMM GRANULOCYTES # BLD AUTO: 0 X10E3/UL (ref 0–0.1)
IMM GRANULOCYTES NFR BLD AUTO: 0 %
INTERPRETATION, 910389: NORMAL
LDLC SERPL CALC-MCNC: 128 MG/DL (ref 0–99)
LYMPHOCYTES # BLD AUTO: 3.7 X10E3/UL (ref 0.7–3.1)
LYMPHOCYTES NFR BLD AUTO: 49 %
MCH RBC QN AUTO: 28.1 PG (ref 26.6–33)
MCHC RBC AUTO-ENTMCNC: 33.1 G/DL (ref 31.5–35.7)
MCV RBC AUTO: 85 FL (ref 79–97)
MONOCYTES # BLD AUTO: 0.5 X10E3/UL (ref 0.1–0.9)
MONOCYTES NFR BLD AUTO: 7 %
NEUTROPHILS # BLD AUTO: 3 X10E3/UL (ref 1.4–7)
NEUTROPHILS NFR BLD AUTO: 40 %
PLATELET # BLD AUTO: 294 X10E3/UL (ref 150–379)
POTASSIUM SERPL-SCNC: 4.6 MMOL/L (ref 3.5–5.2)
PROT SERPL-MCNC: 6.9 G/DL (ref 6–8.5)
RBC # BLD AUTO: 5.05 X10E6/UL (ref 3.77–5.28)
SODIUM SERPL-SCNC: 140 MMOL/L (ref 134–144)
TRIGL SERPL-MCNC: 131 MG/DL (ref 0–149)
TSH SERPL DL<=0.005 MIU/L-ACNC: 0.34 UIU/ML (ref 0.45–4.5)
VLDLC SERPL CALC-MCNC: 26 MG/DL (ref 5–40)
WBC # BLD AUTO: 7.5 X10E3/UL (ref 3.4–10.8)

## 2019-01-17 NOTE — PROGRESS NOTES
Hey there, your labs are overall fantastic!! Your cholesterol is borderline high so really watch the diet for red meat/pork, dairy products, and fried/fast foods. Recheck labs 3 months fasting, Sana

## 2019-02-01 ENCOUNTER — OFFICE VISIT (OUTPATIENT)
Dept: FAMILY MEDICINE CLINIC | Age: 64
End: 2019-02-01

## 2019-02-01 VITALS
OXYGEN SATURATION: 95 % | TEMPERATURE: 98.1 F | RESPIRATION RATE: 16 BRPM | DIASTOLIC BLOOD PRESSURE: 74 MMHG | BODY MASS INDEX: 46.84 KG/M2 | WEIGHT: 238.6 LBS | HEART RATE: 89 BPM | SYSTOLIC BLOOD PRESSURE: 138 MMHG | HEIGHT: 60 IN

## 2019-02-01 DIAGNOSIS — B35.4 TINEA CORPORIS: Primary | ICD-10-CM

## 2019-02-01 RX ORDER — NYSTATIN 100000 U/G
CREAM TOPICAL 2 TIMES DAILY
Qty: 30 G | Refills: 1 | Status: SHIPPED | OUTPATIENT
Start: 2019-02-01

## 2019-02-01 NOTE — PROGRESS NOTES
Emely Quiroz is a 66 y.o. female    Chief Complaint   Patient presents with    Rash     Pt c/o a rash under both armpits fisrt noticed three weeks ago. 1. Have you been to the ER, urgent care clinic since your last visit? Hospitalized since your last visit? No     2. Have you seen or consulted any other health care providers outside of the [address] [place] [address] since your last visit? Include any pap smears or colon screening.   No     Health Maintenance Due   Topic Date Due    Shingrix Vaccine Age 49> (1 of 2) 11/09/2007    Influenza Age 5 to Adult  08/01/2018    FOBT Q 1 YEAR AGE 50-75  11/17/2018       Visit Vitals  /74 (BP 1 Location: Left arm, BP Patient Position: Sitting)   Pulse 89   Temp 98.1 °F (36.7 °C) (Oral)   Resp 16   Ht 5' (1.524 m)   Wt 238 lb 9.6 oz (108.2 kg)   SpO2 95%   BMI 46.60 kg/m²

## 2019-02-05 NOTE — PROGRESS NOTES
HISTORY OF PRESENT ILLNESS  Joel De La Torre is a 66 y.o. female. HPI  Patient is having rash under both arms  Itches and burns  No new deodorants    ROS  A comprehensive review of system was obtained and negative except findings in the HPI    Visit Vitals  /74 (BP 1 Location: Left arm, BP Patient Position: Sitting)   Pulse 89   Temp 98.1 °F (36.7 °C) (Oral)   Resp 16   Ht 5' (1.524 m)   Wt 238 lb 9.6 oz (108.2 kg)   SpO2 95%   BMI 46.60 kg/m²     Physical Exam   Constitutional: She is oriented to person, place, and time. She appears well-developed and well-nourished. Neck: No JVD present. Cardiovascular: Normal rate, regular rhythm and intact distal pulses. Exam reveals no gallop and no friction rub. No murmur heard. Pulmonary/Chest: Effort normal and breath sounds normal. No respiratory distress. She has no wheezes. Musculoskeletal: She exhibits no edema. Neurological: She is alert and oriented to person, place, and time. Skin: Skin is warm. There is erythema. Tinea rash leanne axilla, red and excoriated   Nursing note and vitals reviewed. ASSESSMENT and PLAN  Encounter Diagnoses   Name Primary?  Tinea corporis Yes     Orders Placed This Encounter    nystatin (MYCOSTATIN) topical cream     Given nystatin cream to use bid  Reviewed triggers  Follow up prn    I have discussed the diagnosis with the patient and the intended plan as seen in the above orders. The patient has received an after-visit summary and questions were answered concerning future plans. Patient conveyed understanding of the plan at the time of the visit.     Yandel Angela, MSN, ANP  2/4/2019

## 2019-04-06 DIAGNOSIS — E03.9 HYPOTHYROIDISM, UNSPECIFIED TYPE: ICD-10-CM

## 2019-04-07 RX ORDER — LEVOTHYROXINE SODIUM 175 UG/1
175 TABLET ORAL
Qty: 30 TAB | Refills: 2 | Status: SHIPPED | OUTPATIENT
Start: 2019-04-07

## 2019-05-17 ENCOUNTER — DOCUMENTATION ONLY (OUTPATIENT)
Dept: FAMILY MEDICINE CLINIC | Age: 64
End: 2019-05-17

## 2019-05-17 NOTE — PROGRESS NOTES
Faxed snapshot, 02/01/19 office note & 01/1519 office note/lab results to [address]  [address] in [place]. Patient has appt next week. Also to be sent to [employer] to get rest of records send to new pcp in [place]. Fax #[phone] confirmation & medical release scanned in chart.

## 2019-05-24 ENCOUNTER — DOCUMENTATION ONLY (OUTPATIENT)
Dept: FAMILY MEDICINE CLINIC | Age: 64
End: 2019-05-24

## 2019-05-24 NOTE — PROGRESS NOTES
Lyn for medical records was faxed on 5/21/19 to [address] [place] [address] to be processed
